# Patient Record
Sex: FEMALE | Race: WHITE | NOT HISPANIC OR LATINO | Employment: FULL TIME | URBAN - METROPOLITAN AREA
[De-identification: names, ages, dates, MRNs, and addresses within clinical notes are randomized per-mention and may not be internally consistent; named-entity substitution may affect disease eponyms.]

---

## 2022-04-04 ENCOUNTER — HOSPITAL ENCOUNTER (EMERGENCY)
Facility: HOSPITAL | Age: 42
Discharge: HOME | End: 2022-04-04
Attending: EMERGENCY MEDICINE

## 2022-04-04 VITALS
RESPIRATION RATE: 20 BRPM | BODY MASS INDEX: 23.04 KG/M2 | SYSTOLIC BLOOD PRESSURE: 124 MMHG | DIASTOLIC BLOOD PRESSURE: 82 MMHG | WEIGHT: 130 LBS | HEIGHT: 63 IN | TEMPERATURE: 98 F | HEART RATE: 116 BPM | OXYGEN SATURATION: 97 %
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
## 2022-04-04 DIAGNOSIS — L23.9 ALLERGIC DERMATITIS: ICD-10-CM

## 2022-04-04 PROCEDURE — 99284 EMERGENCY DEPT VISIT MOD MDM: CPT

## 2022-04-04 PROCEDURE — 63600175 PHARM REV CODE 636 W HCPCS: Performed by: NURSE PRACTITIONER

## 2022-04-04 PROCEDURE — 96372 THER/PROPH/DIAG INJ SC/IM: CPT | Performed by: NURSE PRACTITIONER

## 2022-04-04 RX ORDER — ARIPIPRAZOLE 400 MG
400 KIT INTRAMUSCULAR
COMMUNITY

## 2022-04-04 RX ORDER — TRIAMCINOLONE ACETONIDE 1 MG/G
CREAM TOPICAL 2 TIMES DAILY
Qty: 45 G | Refills: 0 | Status: SHIPPED | OUTPATIENT
Start: 2022-04-04 | End: 2023-09-17

## 2022-04-04 RX ORDER — TRIAMCINOLONE ACETONIDE 5 MG/G
OINTMENT TOPICAL 2 TIMES DAILY
Status: DISCONTINUED | OUTPATIENT
Start: 2022-04-04 | End: 2022-04-04

## 2022-04-04 RX ORDER — TRAZODONE HYDROCHLORIDE 100 MG/1
100 TABLET ORAL NIGHTLY
COMMUNITY
End: 2023-09-17

## 2022-04-04 RX ORDER — DEXAMETHASONE SODIUM PHOSPHATE 10 MG/ML
10 INJECTION INTRAMUSCULAR; INTRAVENOUS
Status: COMPLETED | OUTPATIENT
Start: 2022-04-04 | End: 2022-04-04

## 2022-04-04 RX ORDER — METHYLPREDNISOLONE 4 MG/1
TABLET ORAL
Qty: 1 EACH | Refills: 0 | Status: SHIPPED | OUTPATIENT
Start: 2022-04-04 | End: 2022-04-25

## 2022-04-04 RX ADMIN — DEXAMETHASONE SODIUM PHOSPHATE 10 MG: 10 INJECTION INTRAMUSCULAR; INTRAVENOUS at 02:04

## 2022-04-04 NOTE — DISCHARGE INSTRUCTIONS
Start medrol dose pack tomorrow if no improvement. Take the medications as prescribed. Return for any worsening or new symptoms. Follow up with Primary Care Provider in the next 2-3 days.

## 2022-04-04 NOTE — ED PROVIDER NOTES
"Encounter Date: 4/4/2022       History     Chief Complaint   Patient presents with    Insect Bite     PT reports multiple "bites" with itching unrelieved by benadryl and cortisone cream. Pt reports noticing bites after staying in a hotel in [place].     The history is provided by the patient.   Insect Bite  This is a new problem. The current episode started yesterday. The problem occurs constantly. The problem has been gradually worsening. Pertinent negatives include no chest pain, no abdominal pain, no headaches and no shortness of breath. Nothing aggravates the symptoms. Nothing relieves the symptoms. She has tried a warm compress for the symptoms. The treatment provided no relief.     Review of patient's allergies indicates:  No Known Allergies  No past medical history on file.  Past Surgical History:   Procedure Laterality Date    1 x csection      complete hysterectomy      cyst removed      foot surgery x 2 times       History reviewed. No pertinent family history.  Social History     Tobacco Use    Smoking status: Current Every Day Smoker     Types: Cigars    Smokeless tobacco: Never Used   Substance Use Topics    Alcohol use: Yes     Comment: occ    Drug use: Never     Review of Systems   Respiratory: Negative for shortness of breath.    Cardiovascular: Negative for chest pain.   Gastrointestinal: Negative for abdominal pain.   Skin: Positive for rash.   Neurological: Negative for headaches.   All other systems reviewed and are negative.      Physical Exam     Initial Vitals [04/04/22 1433]   BP Pulse Resp Temp SpO2   124/82 (!) 116 20 98.3 °F (36.8 °C) 97 %      MAP       --         Physical Exam    Nursing note and vitals reviewed.  Constitutional: She appears well-developed and well-nourished. No distress.   HENT:   Head: Normocephalic and atraumatic.   Eyes: EOM are normal. Pupils are equal, round, and reactive to light.   Neck:   Normal range of motion.  Cardiovascular: Normal rate and " regular rhythm.   No murmur heard.  Pulmonary/Chest: Breath sounds normal.   Abdominal: Abdomen is soft.   Musculoskeletal:         General: Normal range of motion.      Cervical back: Normal range of motion.     Neurological: She is alert and oriented to person, place, and time. She has normal strength. GCS score is 15. GCS eye subscore is 4. GCS verbal subscore is 5. GCS motor subscore is 6.   Skin: Skin is warm and dry. Rash noted. Rash is papular. There is erythema.        Psychiatric: She has a normal mood and affect.         ED Course   Procedures  Labs Reviewed - No data to display       Imaging Results    None          Medications   dexAMETHasone sodium phos (PF) injection 10 mg (10 mg Intramuscular Given 4/4/22 1450)     Medical Decision Making:   Differential Diagnosis:   Cellulitis, erysipelas, skin abscess, insect bite, necrotizing fasciitis, contact dermatitis     ED Management:  Patient presents with appears to be a superficial skin reaction from insect bites.    Patient does not have evidence for a more malignant process at this time.      The patient does not have evidence for abscess or underlying foreign body.  Patient does not have signs of systemic illness/sepsis.      Please return for new, changing, or worsening pain. The patient was also instructed that follow up with a primary care physician is strongly recommended after any visit to the ED.  Patient expressed understanding and agreed with treatment plan and was discharged in stable condition.                         Clinical Impression:   Final diagnoses:  [W57.XXXA] Insect bite, unspecified site, initial encounter (Primary)  [L23.9] Allergic dermatitis          ED Disposition Condition    Discharge Stable        ED Prescriptions     Medication Sig Dispense Start Date End Date Auth. Provider    methylPREDNISolone (MEDROL DOSEPACK) 4 mg tablet Take it with meals as directed 1 each 4/4/2022 4/25/2022 Tequila Barnhart NP    triamcinolone acetonide  0.1% (KENALOG) 0.1 % cream Apply topically 2 (two) times daily. Do use it on the face 45 g 4/4/2022  Tequila Barnhart NP        Follow-up Information     Follow up With Specialties Details Why Contact Info    PCP  In 2 days      [facility] Emergency Dept Emergency Medicine  If symptoms worsen [address] [facility] [id number] [phone]           Tequila Barnhart NP  04/05/22 0043

## 2023-09-12 ENCOUNTER — HOSPITAL ENCOUNTER (EMERGENCY)
Facility: HOSPITAL | Age: 43
Discharge: HOME | End: 2023-09-12
Attending: EMERGENCY MEDICINE
Payer: COMMERCIAL

## 2023-09-12 VITALS
HEIGHT: 63 IN | WEIGHT: 150 LBS | SYSTOLIC BLOOD PRESSURE: 119 MMHG | BODY MASS INDEX: 26.58 KG/M2 | HEART RATE: 97 BPM | DIASTOLIC BLOOD PRESSURE: 78 MMHG | OXYGEN SATURATION: 100 % | RESPIRATION RATE: 18 BRPM | TEMPERATURE: 98 F

## 2023-09-12 DIAGNOSIS — N20.1 URETEROLITHIASIS: Primary | ICD-10-CM

## 2023-09-12 DIAGNOSIS — N23 RENAL COLIC ON RIGHT SIDE: ICD-10-CM

## 2023-09-12 DIAGNOSIS — N39.0 URINARY TRACT INFECTION WITH HEMATURIA, SITE UNSPECIFIED: ICD-10-CM

## 2023-09-12 DIAGNOSIS — R10.9 ABDOMINAL PAIN: ICD-10-CM

## 2023-09-12 DIAGNOSIS — R31.9 URINARY TRACT INFECTION WITH HEMATURIA, SITE UNSPECIFIED: ICD-10-CM

## 2023-09-12 LAB
ALBUMIN SERPL BCP-MCNC: 3.7 G/DL (ref 3.5–5.2)
ALP SERPL-CCNC: 85 U/L (ref 55–135)
ALT SERPL W/O P-5'-P-CCNC: 18 U/L (ref 10–44)
ANION GAP SERPL CALC-SCNC: 10 MMOL/L (ref 8–16)
AST SERPL-CCNC: 14 U/L (ref 10–40)
BACTERIA #/AREA URNS HPF: ABNORMAL /HPF
BASOPHILS # BLD AUTO: 0.05 K/UL (ref 0–0.2)
BASOPHILS NFR BLD: 0.4 % (ref 0–1.9)
BILIRUB SERPL-MCNC: 0.3 MG/DL (ref 0.1–1)
BILIRUB UR QL STRIP: NEGATIVE
BUN SERPL-MCNC: 11 MG/DL (ref 6–20)
CALCIUM SERPL-MCNC: 8.8 MG/DL (ref 8.7–10.5)
CHLORIDE SERPL-SCNC: 103 MMOL/L (ref 95–110)
CLARITY UR: ABNORMAL
CO2 SERPL-SCNC: 27 MMOL/L (ref 23–29)
COLOR UR: YELLOW
CREAT SERPL-MCNC: 0.7 MG/DL (ref 0.5–1.4)
DIFFERENTIAL METHOD: ABNORMAL
EOSINOPHIL # BLD AUTO: 0.4 K/UL (ref 0–0.5)
EOSINOPHIL NFR BLD: 3.5 % (ref 0–8)
ERYTHROCYTE [DISTWIDTH] IN BLOOD BY AUTOMATED COUNT: 13.6 % (ref 11.5–14.5)
EST. GFR  (NO RACE VARIABLE): >60 ML/MIN/1.73 M^2
GLUCOSE SERPL-MCNC: 80 MG/DL (ref 70–110)
GLUCOSE UR QL STRIP: NEGATIVE
HCT VFR BLD AUTO: 34.2 % (ref 37–48.5)
HGB BLD-MCNC: 11.4 G/DL (ref 12–16)
HGB UR QL STRIP: ABNORMAL
IMM GRANULOCYTES # BLD AUTO: 0.03 K/UL (ref 0–0.04)
IMM GRANULOCYTES NFR BLD AUTO: 0.2 % (ref 0–0.5)
KETONES UR QL STRIP: NEGATIVE
LEUKOCYTE ESTERASE UR QL STRIP: ABNORMAL
LYMPHOCYTES # BLD AUTO: 3.1 K/UL (ref 1–4.8)
LYMPHOCYTES NFR BLD: 25.5 % (ref 18–48)
MCH RBC QN AUTO: 30.1 PG (ref 27–31)
MCHC RBC AUTO-ENTMCNC: 33.3 G/DL (ref 32–36)
MCV RBC AUTO: 90 FL (ref 82–98)
MICROSCOPIC COMMENT: ABNORMAL
MONOCYTES # BLD AUTO: 0.9 K/UL (ref 0.3–1)
MONOCYTES NFR BLD: 7.7 % (ref 4–15)
NEUTROPHILS # BLD AUTO: 7.5 K/UL (ref 1.8–7.7)
NEUTROPHILS NFR BLD: 62.7 % (ref 38–73)
NITRITE UR QL STRIP: NEGATIVE
NRBC BLD-RTO: 0 /100 WBC
PH UR STRIP: 6 [PH] (ref 5–8)
PLATELET # BLD AUTO: 240 K/UL (ref 150–450)
PMV BLD AUTO: 9.5 FL (ref 9.2–12.9)
POTASSIUM SERPL-SCNC: 3.8 MMOL/L (ref 3.5–5.1)
PROT SERPL-MCNC: 7.5 G/DL (ref 6–8.4)
PROT UR QL STRIP: ABNORMAL
RBC # BLD AUTO: 3.79 M/UL (ref 4–5.4)
RBC #/AREA URNS HPF: 9 /HPF (ref 0–4)
SODIUM SERPL-SCNC: 140 MMOL/L (ref 136–145)
SP GR UR STRIP: 1.02 (ref 1–1.03)
SQUAMOUS #/AREA URNS HPF: 4 /HPF
URN SPEC COLLECT METH UR: ABNORMAL
UROBILINOGEN UR STRIP-ACNC: NEGATIVE EU/DL
WBC # BLD AUTO: 12.02 K/UL (ref 3.9–12.7)
WBC #/AREA URNS HPF: 55 /HPF (ref 0–5)

## 2023-09-12 PROCEDURE — 81000 URINALYSIS NONAUTO W/SCOPE: CPT | Performed by: EMERGENCY MEDICINE

## 2023-09-12 PROCEDURE — 99285 EMERGENCY DEPT VISIT HI MDM: CPT | Mod: 25

## 2023-09-12 PROCEDURE — 74176 CT ABD & PELVIS W/O CONTRAST: CPT | Mod: TC

## 2023-09-12 PROCEDURE — 25000003 PHARM REV CODE 250: Performed by: EMERGENCY MEDICINE

## 2023-09-12 PROCEDURE — 74176 CT ABD & PELVIS W/O CONTRAST: CPT | Mod: 26,,, | Performed by: RADIOLOGY

## 2023-09-12 PROCEDURE — 87088 URINE BACTERIA CULTURE: CPT | Performed by: EMERGENCY MEDICINE

## 2023-09-12 PROCEDURE — 63600175 PHARM REV CODE 636 W HCPCS: Performed by: EMERGENCY MEDICINE

## 2023-09-12 PROCEDURE — 36415 COLL VENOUS BLD VENIPUNCTURE: CPT | Performed by: EMERGENCY MEDICINE

## 2023-09-12 PROCEDURE — 96372 THER/PROPH/DIAG INJ SC/IM: CPT | Performed by: EMERGENCY MEDICINE

## 2023-09-12 PROCEDURE — 87086 URINE CULTURE/COLONY COUNT: CPT | Performed by: EMERGENCY MEDICINE

## 2023-09-12 PROCEDURE — 74176 CT ABDOMEN PELVIS WITHOUT CONTRAST: ICD-10-PCS | Mod: 26,,, | Performed by: RADIOLOGY

## 2023-09-12 PROCEDURE — 80053 COMPREHEN METABOLIC PANEL: CPT | Performed by: EMERGENCY MEDICINE

## 2023-09-12 PROCEDURE — 96360 HYDRATION IV INFUSION INIT: CPT

## 2023-09-12 PROCEDURE — 85025 COMPLETE CBC W/AUTO DIFF WBC: CPT | Performed by: EMERGENCY MEDICINE

## 2023-09-12 RX ORDER — LIDOCAINE HYDROCHLORIDE 10 MG/ML
1 INJECTION INFILTRATION; PERINEURAL
Status: COMPLETED | OUTPATIENT
Start: 2023-09-12 | End: 2023-09-12

## 2023-09-12 RX ORDER — CEFTRIAXONE 1 G/1
1 INJECTION, POWDER, FOR SOLUTION INTRAMUSCULAR; INTRAVENOUS
Status: COMPLETED | OUTPATIENT
Start: 2023-09-12 | End: 2023-09-12

## 2023-09-12 RX ORDER — CELECOXIB 100 MG/1
100 CAPSULE ORAL
COMMUNITY
Start: 2023-03-03 | End: 2023-09-17

## 2023-09-12 RX ORDER — CIPROFLOXACIN 500 MG/1
500 TABLET ORAL 2 TIMES DAILY
Qty: 20 TABLET | Refills: 0 | Status: SHIPPED | OUTPATIENT
Start: 2023-09-12 | End: 2023-09-17 | Stop reason: HOSPADM

## 2023-09-12 RX ORDER — ALBUTEROL SULFATE 90 UG/1
2 AEROSOL, METERED RESPIRATORY (INHALATION) EVERY 6 HOURS PRN
COMMUNITY
Start: 2023-07-19

## 2023-09-12 RX ORDER — TAMSULOSIN HYDROCHLORIDE 0.4 MG/1
0.4 CAPSULE ORAL DAILY
Qty: 10 CAPSULE | Refills: 0 | Status: SHIPPED | OUTPATIENT
Start: 2023-09-12 | End: 2023-09-17 | Stop reason: HOSPADM

## 2023-09-12 RX ORDER — ESTRADIOL 0.5 MG/1
0.5 TABLET ORAL DAILY
COMMUNITY
Start: 2023-05-30

## 2023-09-12 RX ORDER — BUSPIRONE HYDROCHLORIDE 10 MG/1
10 TABLET ORAL 2 TIMES DAILY
COMMUNITY
Start: 2023-06-01 | End: 2023-09-17

## 2023-09-12 RX ORDER — HYDROCODONE BITARTRATE AND ACETAMINOPHEN 10; 325 MG/1; MG/1
1 TABLET ORAL EVERY 6 HOURS PRN
Qty: 20 TABLET | Refills: 0 | Status: SHIPPED | OUTPATIENT
Start: 2023-09-12 | End: 2023-09-17 | Stop reason: HOSPADM

## 2023-09-12 RX ORDER — KETOROLAC TROMETHAMINE 30 MG/ML
60 INJECTION, SOLUTION INTRAMUSCULAR; INTRAVENOUS
Status: COMPLETED | OUTPATIENT
Start: 2023-09-12 | End: 2023-09-12

## 2023-09-12 RX ORDER — PROPRANOLOL HYDROCHLORIDE 20 MG/1
20 TABLET ORAL 2 TIMES DAILY
COMMUNITY
Start: 2023-05-30

## 2023-09-12 RX ORDER — PANTOPRAZOLE SODIUM 40 MG/1
40 TABLET, DELAYED RELEASE ORAL
COMMUNITY
Start: 2023-03-03 | End: 2023-09-17

## 2023-09-12 RX ORDER — FLUTICASONE PROPIONATE 50 MCG
1 SPRAY, SUSPENSION (ML) NASAL 2 TIMES DAILY
COMMUNITY
Start: 2023-07-19

## 2023-09-12 RX ADMIN — SODIUM CHLORIDE 1000 ML: 9 INJECTION, SOLUTION INTRAVENOUS at 10:09

## 2023-09-12 RX ADMIN — LIDOCAINE HYDROCHLORIDE 1 ML: 10 INJECTION, SOLUTION INFILTRATION; PERINEURAL at 09:09

## 2023-09-12 RX ADMIN — CEFTRIAXONE SODIUM 1 G: 1 INJECTION, POWDER, FOR SOLUTION INTRAMUSCULAR; INTRAVENOUS at 09:09

## 2023-09-12 RX ADMIN — KETOROLAC TROMETHAMINE 60 MG: 30 INJECTION, SOLUTION INTRAMUSCULAR; INTRAVENOUS at 08:09

## 2023-09-13 NOTE — ED NOTES
Pt here for complaints of right sided flank and RLQ abdominal pain x5 days.  Pt states the pain comes and goes in waves and increases in intensity when it does start to hurt.  Pt states she thought it might be a uti due to how many times she has been voiding but denies any dysuria.  Pt also states she is having some discomfort to the right lower back.  Pt voicing no other concerns at this time.  No questions asked.  Updated on current plan of care and agreeable to plan.  NAD noted.

## 2023-09-13 NOTE — ED PROVIDER NOTES
Encounter Date: 9/12/2023       History     Chief Complaint   Patient presents with    Abdominal Pain     Intermittent RLQ abdominal pain radiating to R flank x 5 days.     Pt here with RLQ pain and right flank pain the past 5days. No dysuria or hematuria. She reports diarrhea this am but says that is not uncommon for her bc she has IBS. No fever. No recent abx. Pain sharp and intermittent. It was worse 2days ago.     The history is provided by the patient.   Abdominal Pain  The current episode started several days ago. The onset of the illness was gradual. The problem has been gradually improving. The abdominal pain is located in the RLQ and right flank. The abdominal pain does not radiate. The severity of the abdominal pain is 6/10. The abdominal pain is relieved by nothing. The other symptoms of the illness include diarrhea. The other symptoms of the illness do not include fever, fatigue, jaundice, melena, nausea, vomiting, dysuria, hematemesis, hematochezia, vaginal discharge or vaginal bleeding.   The diarrhea occurs 2 - 4 times per day.   The patient has not had a change in bowel habit. Symptoms associated with the illness do not include chills, anorexia, diaphoresis, heartburn, constipation, urgency, hematuria, frequency or back pain.     Review of patient's allergies indicates:  No Known Allergies  Past Medical History:   Diagnosis Date    Bipolar disorder, unspecified     GERD (gastroesophageal reflux disease)      Past Surgical History:   Procedure Laterality Date    1 x csection      complete hysterectomy      cyst removed      foot surgery x 2 times       History reviewed. No pertinent family history.  Social History     Tobacco Use    Smoking status: Every Day     Types: Cigars    Smokeless tobacco: Never   Substance Use Topics    Alcohol use: Yes     Comment: occ    Drug use: Never     Review of Systems   Constitutional:  Negative for chills, diaphoresis, fatigue and fever.   Gastrointestinal:   Positive for abdominal pain and diarrhea. Negative for anorexia, constipation, heartburn, hematemesis, hematochezia, jaundice, melena, nausea and vomiting.   Genitourinary:  Negative for dysuria, frequency, hematuria, urgency, vaginal bleeding and vaginal discharge.   Musculoskeletal:  Negative for back pain.   All other systems reviewed and are negative.      Physical Exam     Initial Vitals   BP Pulse Resp Temp SpO2   09/12/23 2027 09/12/23 2024 09/12/23 2024 09/12/23 2024 09/12/23 2024   121/79 100 20 98.2 °F (36.8 °C) 99 %      MAP       --                Physical Exam    Nursing note and vitals reviewed.  Constitutional: She appears well-developed and well-nourished. She is not diaphoretic. No distress.   HENT:   Mouth/Throat: Oropharynx is clear and moist.   Eyes: No scleral icterus.   Cardiovascular:  Normal rate, regular rhythm, normal heart sounds and intact distal pulses.           Pulmonary/Chest: Breath sounds normal. She exhibits no tenderness.   Abdominal: Abdomen is soft. There is no abdominal tenderness.   No CVAT   Musculoskeletal:         General: No tenderness or edema. Normal range of motion.     Neurological: She is alert and oriented to person, place, and time. GCS score is 15. GCS eye subscore is 4. GCS verbal subscore is 5. GCS motor subscore is 6.   Skin: Skin is warm and dry. Capillary refill takes less than 2 seconds. No rash noted. No erythema. No pallor.   Psychiatric: She has a normal mood and affect.         ED Course   Procedures  Labs Reviewed   URINALYSIS, REFLEX TO URINE CULTURE - Abnormal; Notable for the following components:       Result Value    Appearance, UA Hazy (*)     Protein, UA Trace (*)     Occult Blood UA 1+ (*)     Leukocytes, UA 1+ (*)     All other components within normal limits    Narrative:     Preferred Collection Type->Urine, Clean Catch  Specimen Source->Urine   URINALYSIS MICROSCOPIC - Abnormal; Notable for the following components:    RBC, UA 9 (*)     WBC,  UA 55 (*)     Bacteria Few (*)     All other components within normal limits    Narrative:     Preferred Collection Type->Urine, Clean Catch  Specimen Source->Urine   CBC W/ AUTO DIFFERENTIAL - Abnormal; Notable for the following components:    RBC 3.79 (*)     Hemoglobin 11.4 (*)     Hematocrit 34.2 (*)     All other components within normal limits   CULTURE, URINE   COMPREHENSIVE METABOLIC PANEL          Imaging Results              CT Abdomen Pelvis  Without Contrast (Final result)  Result time 09/12/23 21:49:40      Final result by Terese Adams MD (09/12/23 21:49:40)                   Impression:      5 mm obstructive stone in the right distal ureter with resultant severe hydroureteronephrosis. Diffuse infectious or inflammatory right perinephric fat stranding.      Electronically signed by: Terese Adams  Date:    09/12/2023  Time:    21:49               Narrative:    EXAMINATION:  CT ABDOMEN PELVIS WITHOUT CONTRAST    CLINICAL HISTORY:  Flank pain, kidney stone suspected;RLQ abdominal pain (Age >= 14y);    TECHNIQUE:  Low dose axial images, sagittal and coronal reformations were obtained from the lung bases to the pubic symphysis.  Oral contrast was not administered.    COMPARISON:  None    FINDINGS:  5 mm obstructive stone in the right distal ureter with resultant severe hydroureteronephrosis.  Diffuse infectious or inflammatory right perinephric fat stranding.    Unremarkable left kidney and ureter.  Unremarkable adrenal glands.    Lung bases are clear.    Unremarkable liver and gallbladder.    Unremarkable spleen and pancreas.    No small bowel obstruction.    No acute infectious or inflammatory process involving bowel.  Normal appendix.    No free air, free fluid, or lymphadenopathy.    Unremarkable bladder and uterus.    Unremarkable aorta and its branches.  Small fat containing umbilical hernia..    Bones are intact.                                       Medications   sodium chloride 0.9%  bolus 1,000 mL 1,000 mL (1,000 mLs Intravenous New Bag 9/12/23 2230)   ketorolac injection 60 mg (60 mg Intramuscular Given 9/12/23 2042)   cefTRIAXone injection 1 g (1 g Intramuscular Given 9/12/23 2114)   LIDOcaine HCL 10 mg/ml (1%) injection 1 mL (1 mL Intramuscular Given 9/12/23 2114)     Medical Decision Making  Pt presented with right flank pain for 5days. Benign exam. UA with minor infection. Rocephin given. Toradol given for pain. CT showed 5mm obstructing stone in right distal ureter. Labs checked and unremarkable. Normal WBC and renal function. Will Rx norco and cipro and send urology referral. Return precautions discussed.     Amount and/or Complexity of Data Reviewed  Labs: ordered. Decision-making details documented in ED Course.  Radiology: ordered. Decision-making details documented in ED Course.    Risk  Prescription drug management.               ED Course as of 09/12/23 2247   Tue Sep 12, 2023   2150 CT reviewed. Distal ureteral stone with mod hydro and stranding seen. Rads read pending.  [DC]   2154 CT abd:  Impression:     5 mm obstructive stone in the right distal ureter with resultant severe hydroureteronephrosis. Diffuse infectious or inflammatory right perinephric fat stranding. [DC]   2154 Will place IV and given IVFs and check Cr. [DC]      ED Course User Index  [DC] Iban Poe Jr., MD                    Clinical Impression:   Final diagnoses:  [R10.9] Abdominal pain  [N20.1] Ureterolithiasis (Primary)  [N23] Renal colic on right side  [N39.0, R31.9] Urinary tract infection with hematuria, site unspecified        ED Disposition Condition    Discharge Stable          ED Prescriptions       Medication Sig Dispense Start Date End Date Auth. Provider    ciprofloxacin HCl (CIPRO) 500 MG tablet Take 1 tablet (500 mg total) by mouth 2 (two) times daily. for 10 days 20 tablet 9/12/2023 9/22/2023 Iban Poe Jr., MD    HYDROcodone-acetaminophen (NORCO)  mg per tablet Take 1 tablet by  mouth every 6 (six) hours as needed for Pain. 20 tablet 9/12/2023 -- Iban Poe Jr., MD    tamsulosin (FLOMAX) 0.4 mg Cap Take 1 capsule (0.4 mg total) by mouth once daily. 10 capsule 9/12/2023 -- Iban Poe Jr., MD          Follow-up Information       Follow up With Specialties Details Why Contact Info    Ivan Meek MD Family Medicine Schedule an appointment as soon as possible for a visit   [address] [place] [id number] [phone]               Iban Poe Jr., MD  09/12/23 0618

## 2023-09-14 LAB — BACTERIA UR CULT: ABNORMAL

## 2023-09-17 ENCOUNTER — HOSPITAL ENCOUNTER (OUTPATIENT)
Facility: HOSPITAL | Age: 43
LOS: 1 days | Discharge: HOME | End: 2023-09-18
Attending: STUDENT IN AN ORGANIZED HEALTH CARE EDUCATION/TRAINING PROGRAM | Admitting: HOSPITALIST
Payer: COMMERCIAL

## 2023-09-17 DIAGNOSIS — N39.0 URINARY TRACT INFECTION WITHOUT HEMATURIA, SITE UNSPECIFIED: ICD-10-CM

## 2023-09-17 DIAGNOSIS — R52 INTRACTABLE PAIN: ICD-10-CM

## 2023-09-17 DIAGNOSIS — N13.9 ACUTE UNILATERAL OBSTRUCTIVE UROPATHY: ICD-10-CM

## 2023-09-17 DIAGNOSIS — N20.0 KIDNEY STONE: ICD-10-CM

## 2023-09-17 DIAGNOSIS — N20.1 RIGHT URETERAL STONE: Primary | ICD-10-CM

## 2023-09-17 LAB
ALBUMIN SERPL BCP-MCNC: 3.9 G/DL (ref 3.5–5.2)
ALLENS TEST: ABNORMAL
ALP SERPL-CCNC: 86 U/L (ref 55–135)
ALT SERPL W/O P-5'-P-CCNC: 25 U/L (ref 10–44)
ANION GAP SERPL CALC-SCNC: 14 MMOL/L (ref 8–16)
APTT PPP: 26.1 SEC (ref 21–32)
AST SERPL-CCNC: 18 U/L (ref 10–40)
BACTERIA #/AREA URNS HPF: ABNORMAL /HPF
BASOPHILS # BLD AUTO: 0.03 K/UL (ref 0–0.2)
BASOPHILS NFR BLD: 0.4 % (ref 0–1.9)
BILIRUB SERPL-MCNC: 0.2 MG/DL (ref 0.1–1)
BILIRUB UR QL STRIP: NEGATIVE
BUN SERPL-MCNC: 12 MG/DL (ref 6–20)
CALCIUM SERPL-MCNC: 9.9 MG/DL (ref 8.7–10.5)
CHLORIDE SERPL-SCNC: 103 MMOL/L (ref 95–110)
CLARITY UR: ABNORMAL
CO2 SERPL-SCNC: 25 MMOL/L (ref 23–29)
COLOR UR: YELLOW
CREAT SERPL-MCNC: 0.7 MG/DL (ref 0.5–1.4)
DELSYS: ABNORMAL
DIFFERENTIAL METHOD: ABNORMAL
EOSINOPHIL # BLD AUTO: 0.2 K/UL (ref 0–0.5)
EOSINOPHIL NFR BLD: 2.8 % (ref 0–8)
ERYTHROCYTE [DISTWIDTH] IN BLOOD BY AUTOMATED COUNT: 13.2 % (ref 11.5–14.5)
EST. GFR  (NO RACE VARIABLE): >60 ML/MIN/1.73 M^2
GLUCOSE SERPL-MCNC: 92 MG/DL (ref 70–110)
GLUCOSE UR QL STRIP: NEGATIVE
HCT VFR BLD AUTO: 36.3 % (ref 37–48.5)
HGB BLD-MCNC: 11.7 G/DL (ref 12–16)
HGB UR QL STRIP: NEGATIVE
IMM GRANULOCYTES # BLD AUTO: 0.02 K/UL (ref 0–0.04)
IMM GRANULOCYTES NFR BLD AUTO: 0.2 % (ref 0–0.5)
INR PPP: 0.9 (ref 0.8–1.2)
KETONES UR QL STRIP: NEGATIVE
LACTATE SERPL-SCNC: 0.9 MMOL/L (ref 0.5–2.2)
LDH SERPL L TO P-CCNC: 1.63 MMOL/L (ref 0.36–1.25)
LEUKOCYTE ESTERASE UR QL STRIP: ABNORMAL
LYMPHOCYTES # BLD AUTO: 2.3 K/UL (ref 1–4.8)
LYMPHOCYTES NFR BLD: 27.3 % (ref 18–48)
MCH RBC QN AUTO: 29 PG (ref 27–31)
MCHC RBC AUTO-ENTMCNC: 32.2 G/DL (ref 32–36)
MCV RBC AUTO: 90 FL (ref 82–98)
MICROSCOPIC COMMENT: ABNORMAL
MONOCYTES # BLD AUTO: 0.6 K/UL (ref 0.3–1)
MONOCYTES NFR BLD: 6.8 % (ref 4–15)
NEUTROPHILS # BLD AUTO: 5.2 K/UL (ref 1.8–7.7)
NEUTROPHILS NFR BLD: 62.5 % (ref 38–73)
NITRITE UR QL STRIP: NEGATIVE
NRBC BLD-RTO: 0 /100 WBC
PH UR STRIP: 7 [PH] (ref 5–8)
PLATELET # BLD AUTO: 325 K/UL (ref 150–450)
PMV BLD AUTO: 9.2 FL (ref 9.2–12.9)
POTASSIUM SERPL-SCNC: 3.4 MMOL/L (ref 3.5–5.1)
PROT SERPL-MCNC: 8.1 G/DL (ref 6–8.4)
PROT UR QL STRIP: NEGATIVE
PROTHROMBIN TIME: 10.3 SEC (ref 9–12.5)
RBC # BLD AUTO: 4.03 M/UL (ref 4–5.4)
RBC #/AREA URNS HPF: 2 /HPF (ref 0–4)
SAMPLE: ABNORMAL
SITE: ABNORMAL
SODIUM SERPL-SCNC: 142 MMOL/L (ref 136–145)
SP GR UR STRIP: 1.01 (ref 1–1.03)
SQUAMOUS #/AREA URNS HPF: 13 /HPF
URN SPEC COLLECT METH UR: ABNORMAL
UROBILINOGEN UR STRIP-ACNC: NEGATIVE EU/DL
WBC # BLD AUTO: 8.36 K/UL (ref 3.9–12.7)
WBC #/AREA URNS HPF: 9 /HPF (ref 0–5)
YEAST URNS QL MICRO: ABNORMAL

## 2023-09-17 PROCEDURE — 99900035 HC TECH TIME PER 15 MIN (STAT)

## 2023-09-17 PROCEDURE — 36416 COLLJ CAPILLARY BLOOD SPEC: CPT

## 2023-09-17 PROCEDURE — 81000 URINALYSIS NONAUTO W/SCOPE: CPT | Performed by: STUDENT IN AN ORGANIZED HEALTH CARE EDUCATION/TRAINING PROGRAM

## 2023-09-17 PROCEDURE — 85610 PROTHROMBIN TIME: CPT | Performed by: STUDENT IN AN ORGANIZED HEALTH CARE EDUCATION/TRAINING PROGRAM

## 2023-09-17 PROCEDURE — 25000003 PHARM REV CODE 250: Performed by: NURSE PRACTITIONER

## 2023-09-17 PROCEDURE — 99214 OFFICE O/P EST MOD 30 MIN: CPT | Mod: ,,, | Performed by: UROLOGY

## 2023-09-17 PROCEDURE — G0378 HOSPITAL OBSERVATION PER HR: HCPCS

## 2023-09-17 PROCEDURE — 63600175 PHARM REV CODE 636 W HCPCS: Performed by: NURSE PRACTITIONER

## 2023-09-17 PROCEDURE — 87040 BLOOD CULTURE FOR BACTERIA: CPT | Mod: 59 | Performed by: STUDENT IN AN ORGANIZED HEALTH CARE EDUCATION/TRAINING PROGRAM

## 2023-09-17 PROCEDURE — 36415 COLL VENOUS BLD VENIPUNCTURE: CPT | Performed by: STUDENT IN AN ORGANIZED HEALTH CARE EDUCATION/TRAINING PROGRAM

## 2023-09-17 PROCEDURE — 63600175 PHARM REV CODE 636 W HCPCS: Performed by: STUDENT IN AN ORGANIZED HEALTH CARE EDUCATION/TRAINING PROGRAM

## 2023-09-17 PROCEDURE — 85730 THROMBOPLASTIN TIME PARTIAL: CPT | Performed by: STUDENT IN AN ORGANIZED HEALTH CARE EDUCATION/TRAINING PROGRAM

## 2023-09-17 PROCEDURE — 96365 THER/PROPH/DIAG IV INF INIT: CPT

## 2023-09-17 PROCEDURE — 96375 TX/PRO/DX INJ NEW DRUG ADDON: CPT

## 2023-09-17 PROCEDURE — 94761 N-INVAS EAR/PLS OXIMETRY MLT: CPT

## 2023-09-17 PROCEDURE — 25000003 PHARM REV CODE 250: Performed by: STUDENT IN AN ORGANIZED HEALTH CARE EDUCATION/TRAINING PROGRAM

## 2023-09-17 PROCEDURE — 80053 COMPREHEN METABOLIC PANEL: CPT | Performed by: STUDENT IN AN ORGANIZED HEALTH CARE EDUCATION/TRAINING PROGRAM

## 2023-09-17 PROCEDURE — 93010 ELECTROCARDIOGRAM REPORT: CPT | Mod: ,,, | Performed by: GENERAL PRACTICE

## 2023-09-17 PROCEDURE — 83605 ASSAY OF LACTIC ACID: CPT | Performed by: STUDENT IN AN ORGANIZED HEALTH CARE EDUCATION/TRAINING PROGRAM

## 2023-09-17 PROCEDURE — 99214 PR OFFICE/OUTPT VISIT, EST, LEVL IV, 30-39 MIN: ICD-10-PCS | Mod: ,,, | Performed by: UROLOGY

## 2023-09-17 PROCEDURE — 96366 THER/PROPH/DIAG IV INF ADDON: CPT

## 2023-09-17 PROCEDURE — 93010 EKG 12-LEAD: ICD-10-PCS | Mod: ,,, | Performed by: GENERAL PRACTICE

## 2023-09-17 PROCEDURE — 83605 ASSAY OF LACTIC ACID: CPT

## 2023-09-17 PROCEDURE — 99285 EMERGENCY DEPT VISIT HI MDM: CPT | Mod: 25

## 2023-09-17 PROCEDURE — 85025 COMPLETE CBC W/AUTO DIFF WBC: CPT | Performed by: STUDENT IN AN ORGANIZED HEALTH CARE EDUCATION/TRAINING PROGRAM

## 2023-09-17 PROCEDURE — 93005 ELECTROCARDIOGRAM TRACING: CPT

## 2023-09-17 PROCEDURE — S4991 NICOTINE PATCH NONLEGEND: HCPCS | Performed by: NURSE PRACTITIONER

## 2023-09-17 RX ORDER — PROCHLORPERAZINE EDISYLATE 5 MG/ML
5 INJECTION INTRAMUSCULAR; INTRAVENOUS EVERY 6 HOURS PRN
Status: DISCONTINUED | OUTPATIENT
Start: 2023-09-17 | End: 2023-09-18 | Stop reason: HOSPADM

## 2023-09-17 RX ORDER — IBUPROFEN 200 MG
16 TABLET ORAL
Status: DISCONTINUED | OUTPATIENT
Start: 2023-09-17 | End: 2023-09-18 | Stop reason: HOSPADM

## 2023-09-17 RX ORDER — SODIUM,POTASSIUM PHOSPHATES 280-250MG
2 POWDER IN PACKET (EA) ORAL
Status: DISCONTINUED | OUTPATIENT
Start: 2023-09-17 | End: 2023-09-18 | Stop reason: HOSPADM

## 2023-09-17 RX ORDER — SODIUM CHLORIDE 0.9 % (FLUSH) 0.9 %
10 SYRINGE (ML) INJECTION EVERY 8 HOURS PRN
Status: DISCONTINUED | OUTPATIENT
Start: 2023-09-17 | End: 2023-09-18 | Stop reason: HOSPADM

## 2023-09-17 RX ORDER — ARIPIPRAZOLE 10 MG/1
10 TABLET ORAL DAILY
COMMUNITY
Start: 2023-07-19

## 2023-09-17 RX ORDER — HYDROMORPHONE HYDROCHLORIDE 1 MG/ML
1 INJECTION, SOLUTION INTRAMUSCULAR; INTRAVENOUS; SUBCUTANEOUS EVERY 4 HOURS PRN
Status: DISCONTINUED | OUTPATIENT
Start: 2023-09-17 | End: 2023-09-18 | Stop reason: HOSPADM

## 2023-09-17 RX ORDER — KETOROLAC TROMETHAMINE 30 MG/ML
15 INJECTION, SOLUTION INTRAMUSCULAR; INTRAVENOUS
Status: COMPLETED | OUTPATIENT
Start: 2023-09-17 | End: 2023-09-17

## 2023-09-17 RX ORDER — SIMETHICONE 80 MG
1 TABLET,CHEWABLE ORAL 4 TIMES DAILY PRN
Status: DISCONTINUED | OUTPATIENT
Start: 2023-09-17 | End: 2023-09-18 | Stop reason: HOSPADM

## 2023-09-17 RX ORDER — TALC
9 POWDER (GRAM) TOPICAL NIGHTLY PRN
Status: DISCONTINUED | OUTPATIENT
Start: 2023-09-17 | End: 2023-09-18 | Stop reason: HOSPADM

## 2023-09-17 RX ORDER — IBUPROFEN 200 MG
1 TABLET ORAL DAILY
Status: DISCONTINUED | OUTPATIENT
Start: 2023-09-17 | End: 2023-09-18 | Stop reason: HOSPADM

## 2023-09-17 RX ORDER — TRAZODONE HYDROCHLORIDE 100 MG/1
100 TABLET ORAL NIGHTLY
COMMUNITY

## 2023-09-17 RX ORDER — IPRATROPIUM BROMIDE AND ALBUTEROL SULFATE 2.5; .5 MG/3ML; MG/3ML
3 SOLUTION RESPIRATORY (INHALATION) EVERY 4 HOURS PRN
Status: DISCONTINUED | OUTPATIENT
Start: 2023-09-17 | End: 2023-09-18 | Stop reason: HOSPADM

## 2023-09-17 RX ORDER — ONDANSETRON 2 MG/ML
4 INJECTION INTRAMUSCULAR; INTRAVENOUS EVERY 8 HOURS PRN
Status: DISCONTINUED | OUTPATIENT
Start: 2023-09-17 | End: 2023-09-18 | Stop reason: HOSPADM

## 2023-09-17 RX ORDER — MAG HYDROX/ALUMINUM HYD/SIMETH 200-200-20
30 SUSPENSION, ORAL (FINAL DOSE FORM) ORAL 4 TIMES DAILY PRN
Status: DISCONTINUED | OUTPATIENT
Start: 2023-09-17 | End: 2023-09-18 | Stop reason: HOSPADM

## 2023-09-17 RX ORDER — ACETAMINOPHEN 325 MG/1
650 TABLET ORAL EVERY 6 HOURS PRN
Status: DISCONTINUED | OUTPATIENT
Start: 2023-09-17 | End: 2023-09-18 | Stop reason: HOSPADM

## 2023-09-17 RX ORDER — TAMSULOSIN HYDROCHLORIDE 0.4 MG/1
0.4 CAPSULE ORAL NIGHTLY
Qty: 30 CAPSULE | Refills: 0 | Status: SHIPPED | OUTPATIENT
Start: 2023-09-17 | End: 2023-10-17

## 2023-09-17 RX ORDER — LANOLIN ALCOHOL/MO/W.PET/CERES
800 CREAM (GRAM) TOPICAL
Status: DISCONTINUED | OUTPATIENT
Start: 2023-09-17 | End: 2023-09-18 | Stop reason: HOSPADM

## 2023-09-17 RX ORDER — SODIUM CHLORIDE 9 MG/ML
INJECTION, SOLUTION INTRAVENOUS ONCE
Status: COMPLETED | OUTPATIENT
Start: 2023-09-17 | End: 2023-09-18

## 2023-09-17 RX ORDER — TRAMADOL HYDROCHLORIDE 50 MG/1
50 TABLET ORAL EVERY 6 HOURS PRN
Qty: 10 TABLET | Refills: 0 | Status: SHIPPED | OUTPATIENT
Start: 2023-09-17

## 2023-09-17 RX ORDER — KETOROLAC TROMETHAMINE 10 MG/1
10 TABLET, FILM COATED ORAL EVERY 8 HOURS PRN
Qty: 10 TABLET | Refills: 0 | Status: SHIPPED | OUTPATIENT
Start: 2023-09-17

## 2023-09-17 RX ORDER — FENTANYL CITRATE 50 UG/ML
50 INJECTION, SOLUTION INTRAMUSCULAR; INTRAVENOUS
Status: COMPLETED | OUTPATIENT
Start: 2023-09-17 | End: 2023-09-17

## 2023-09-17 RX ORDER — TRAZODONE HYDROCHLORIDE 50 MG/1
100 TABLET ORAL NIGHTLY PRN
Status: DISCONTINUED | OUTPATIENT
Start: 2023-09-17 | End: 2023-09-18 | Stop reason: HOSPADM

## 2023-09-17 RX ORDER — ARIPIPRAZOLE 5 MG/1
10 TABLET ORAL DAILY
Status: DISCONTINUED | OUTPATIENT
Start: 2023-09-18 | End: 2023-09-18 | Stop reason: HOSPADM

## 2023-09-17 RX ORDER — MORPHINE SULFATE 4 MG/ML
4 INJECTION, SOLUTION INTRAMUSCULAR; INTRAVENOUS EVERY 4 HOURS PRN
Status: DISCONTINUED | OUTPATIENT
Start: 2023-09-17 | End: 2023-09-18 | Stop reason: HOSPADM

## 2023-09-17 RX ORDER — IBUPROFEN 200 MG
24 TABLET ORAL
Status: DISCONTINUED | OUTPATIENT
Start: 2023-09-17 | End: 2023-09-18 | Stop reason: HOSPADM

## 2023-09-17 RX ORDER — ACETAMINOPHEN 325 MG/1
650 TABLET ORAL EVERY 4 HOURS PRN
Status: DISCONTINUED | OUTPATIENT
Start: 2023-09-17 | End: 2023-09-18 | Stop reason: HOSPADM

## 2023-09-17 RX ORDER — NALOXONE HCL 0.4 MG/ML
0.02 VIAL (ML) INJECTION
Status: DISCONTINUED | OUTPATIENT
Start: 2023-09-17 | End: 2023-09-18 | Stop reason: HOSPADM

## 2023-09-17 RX ORDER — GLUCAGON 1 MG
1 KIT INJECTION
Status: DISCONTINUED | OUTPATIENT
Start: 2023-09-17 | End: 2023-09-18 | Stop reason: HOSPADM

## 2023-09-17 RX ORDER — TRAZODONE HYDROCHLORIDE 50 MG/1
50 TABLET ORAL NIGHTLY
COMMUNITY
Start: 2023-09-13 | End: 2023-09-17

## 2023-09-17 RX ADMIN — MORPHINE SULFATE 4 MG: 4 INJECTION, SOLUTION INTRAMUSCULAR; INTRAVENOUS at 09:09

## 2023-09-17 RX ADMIN — KETOROLAC TROMETHAMINE 15 MG: 30 INJECTION, SOLUTION INTRAMUSCULAR; INTRAVENOUS at 06:09

## 2023-09-17 RX ADMIN — CEFTRIAXONE SODIUM 1 G: 1 INJECTION, POWDER, FOR SOLUTION INTRAMUSCULAR; INTRAVENOUS at 05:09

## 2023-09-17 RX ADMIN — SODIUM CHLORIDE: 9 INJECTION, SOLUTION INTRAVENOUS at 09:09

## 2023-09-17 RX ADMIN — TRAZODONE HYDROCHLORIDE 100 MG: 50 TABLET ORAL at 11:09

## 2023-09-17 RX ADMIN — FENTANYL CITRATE 50 MCG: 0.05 INJECTION, SOLUTION INTRAMUSCULAR; INTRAVENOUS at 07:09

## 2023-09-17 RX ADMIN — NICOTINE 1 PATCH: 14 PATCH TRANSDERMAL at 11:09

## 2023-09-17 NOTE — ED PROVIDER NOTES
Encounter Date: 9/17/2023       History     Chief Complaint   Patient presents with    Flank Pain     Rt. Side  / seen at [facility] Tuesday      43-year-old female presents with right flank pain.  Currently on oral antibiotics Cipro.  Patient recently diagnosed with infected kidney stone since outpatient therapy with Urology follow up and strict return precautions by outside provider.  Patient reports she is not passed the stone at this point has uncontrolled pain.  Moderate to severe severity, associated nausea and vomiting and has difficulty taking home medications    The history is provided by the patient.     Review of patient's allergies indicates:  No Known Allergies  Past Medical History:   Diagnosis Date    Bipolar disorder, unspecified     GERD (gastroesophageal reflux disease)      Past Surgical History:   Procedure Laterality Date    1 x csection      complete hysterectomy      cyst removed      foot surgery x 2 times       No family history on file.  Social History     Tobacco Use    Smoking status: Every Day     Types: Cigars    Smokeless tobacco: Never   Substance Use Topics    Alcohol use: Yes     Comment: occ    Drug use: Never     Review of Systems   All other systems reviewed and are negative.      Physical Exam     Initial Vitals [09/17/23 1658]   BP Pulse Resp Temp SpO2   (!) 134/91 (!) 112 18 97.6 °F (36.4 °C) 98 %      MAP       --         Physical Exam    Nursing note and vitals reviewed.  Constitutional: She appears well-developed and well-nourished.   HENT:   Head: Normocephalic and atraumatic.   Eyes: EOM are normal. Right eye exhibits no discharge. Left eye exhibits no discharge.   Neck:   Normal range of motion.  Cardiovascular:  Regular rhythm.           Tachycardic   Pulmonary/Chest: Effort normal. No stridor. No respiratory distress.   No accessory muscle usage or significant hypoxemia   Abdominal: Abdomen is soft. There is no abdominal tenderness.   Genitourinary:    Genitourinary  Comments: Right CVA tenderness to palpation,  no left CVA tenderness     Musculoskeletal:         General: No edema. Normal range of motion.      Cervical back: Normal range of motion.     Neurological: She is alert.   No focal motor or sensory deficit noted   Skin: Skin is warm. No rash noted. No erythema.   Psychiatric:   Not anxious or agitated         ED Course   Procedures  Labs Reviewed   CBC W/ AUTO DIFFERENTIAL - Abnormal; Notable for the following components:       Result Value    Hemoglobin 11.7 (*)     Hematocrit 36.3 (*)     All other components within normal limits   COMPREHENSIVE METABOLIC PANEL - Abnormal; Notable for the following components:    Potassium 3.4 (*)     All other components within normal limits   URINALYSIS, REFLEX TO URINE CULTURE - Abnormal; Notable for the following components:    Appearance, UA Hazy (*)     Leukocytes, UA Trace (*)     All other components within normal limits    Narrative:     Specimen Source->Urine   URINALYSIS MICROSCOPIC - Abnormal; Notable for the following components:    WBC, UA 9 (*)     Yeast, UA Rare (*)     All other components within normal limits    Narrative:     Specimen Source->Urine   ISTAT LACTATE - Abnormal; Notable for the following components:    POC Lactate 1.63 (*)     All other components within normal limits   CULTURE, BLOOD   CULTURE, BLOOD   APTT   PROTIME-INR   LACTIC ACID, PLASMA     EKG Readings: (Independently Interpreted)   EKG interpreted by myself Ricardo Ga DO  Rate 85, normal sinus rhythm, QRS 78, , no STEMI       Imaging Results              CT Renal Stone Study ABD Pelvis WO (Final result)  Result time 09/17/23 18:34:18      Final result by Iban Munoz MD (09/17/23 18:34:18)                   Impression:      Persistent 5 x 6 mm calculus obstructing the distal RIGHT ureter.    Decreased perinephric stranding.      Electronically signed by: Iban Munoz  Date:    09/17/2023  Time:    18:34                Narrative:    EXAMINATION:  CT RENAL STONE STUDY ABD PELVIS WO    CLINICAL HISTORY:  Flank pain, kidney stone suspected;    TECHNIQUE:  Low dose axial images, sagittal and coronal reformations were obtained from the lung bases to the pubic symphysis.  Contrast was not administered.    COMPARISON:  09/12/2023    FINDINGS:  There is moderate hydro nephrosis and hydroureter on the RIGHT with a distal 5 x 6 mm 1100 Hounsfield units stone approximately 4 cm from the UVJ.  No other nonobstructing upper kidney stones are evident in either kidney.  Urinary bladder is unremarkable.    The liver, spleen, gallbladder, pancreas, adrenal glands and loops of large and small intestines appear unremarkable without contrast.  The uterus appears normal.    Bony structures are intact.  Aorta and vena cava normal in caliber.  No and abdominal wall or inguinal hernia.  The base of the heart pericardium, lung bases and bones appear intact.                                       X-Ray Chest AP Portable (Final result)  Result time 09/17/23 18:17:01      Final result by Genevieve Munoz MD (09/17/23 18:17:01)                   Impression:      No acute or focal abnormality.      Electronically signed by: Genevieve Munoz  Date:    09/17/2023  Time:    18:17               Narrative:    EXAMINATION:  XR CHEST AP PORTABLE    CLINICAL HISTORY:  Sepsis;    TECHNIQUE:  Single frontal view of the chest was performed.    COMPARISON:  None    FINDINGS:  The cardiomediastinal silhouette is not enlarged.  The lungs appear clear.  There is no pleural effusion or pneumothorax.  Imaged osseous structures are intact.                                       Medications   cefTRIAXone (ROCEPHIN) 1 g in dextrose 5 % in water (D5W) 100 mL IVPB (MB+) (0 g Intravenous Stopped 9/17/23 1919)   ketorolac injection 15 mg (15 mg Intravenous Given 9/17/23 1830)   fentaNYL 50 mcg/mL injection 50 mcg (50 mcg Intravenous Given 9/17/23 1910)     Medical Decision  Making  43-year-old female presents with right-sided flank pain.  History of obstructive stone with UTI.  Unable to tolerate oral antibiotics due to nausea and vomiting.  Outside records reviewed demonstrated right-sided obstructive kidney stone.  Sepsis protocol initiated, patient administered IV Rocephin.  CT imaging we obtained to rule out any acute kidney injury such as rupture of calyx.  CT imaging with persistent obstructing stone with no definitive kidney structural injury.  Communicated with Dr. Isaac urologist via secure chat we will perform procedure today to remove stone, urinalysis evidence of infection.  We will admit to hospitalist for intractable pain and treatment of UTI.  Spoke with Sandeep Mensah nurse practitioner with hospitalist team.  Patient updated and agrees with plan.  Patient administered IV fentanyl and IV Toradol for pain control    Amount and/or Complexity of Data Reviewed  External Data Reviewed: labs and radiology.     Details: UTI, obstructed right-sided kidney stone with severe hydronephrosis  Labs: ordered. Decision-making details documented in ED Course.  Radiology: ordered.  ECG/medicine tests: ordered and independent interpretation performed.     Details: No STEMI    Risk  Prescription drug management.  Decision regarding hospitalization.               ED Course as of 09/17/23 1926   Sun Sep 17, 2023   1848 Sodium: 142 [KB]   1848 Potassium(!): 3.4 [KB]   1848 Glucose: 92 [KB]   1848 BUN: 12 [KB]   1848 Creatinine: 0.7 [KB]   1848 INR: 0.9 [KB]   1848 aPTT: 26.1 [KB]   1848 WBC: 8.36 [KB]   1848 Hemoglobin(!): 11.7 [KB]   1848 Hematocrit(!): 36.3 [KB]   1848 Sample: CAPILLARY [KB]   1848 POC Lactate(!): 1.63 [KB]   1848 WBC, UA(!): 9 [KB]   1848 Specimen UA: Urine, Clean Catch [KB]   1848 Appearance, UA(!): Hazy [KB]   1920 This patient does have evidence of infective focus  My overall impression is UTI, Obstructed kidney stone.  Source: Urinary Tract  Antibiotics given-  Antibiotics (72h ago, onward)    None      Latest lactate reviewed-  @[id number](lactate,poclac)@  Organ dysfunction indicated by N/A    Fluid challenge Not needed - patient is not hypotensive      Post- resuscitation assessment No - Post resuscitation assessment not needed       Will Not start Pressors- Levophed for MAP of 65  Source control achieved by: Ceftriaxone   [KB]      ED Course User Index  [KB] Ricardo Ga Jr., DO                      Clinical Impression:   Final diagnoses:  [R52] Intractable pain (Primary)  [N39.0] Urinary tract infection without hematuria, site unspecified  [N20.0] Kidney stone - Right-sided        ED Disposition Condition    Observation Stable                Ricardo Ga Jr., DO  09/17/23 1923       Ricardo Ga Jr.,   09/17/23 1926

## 2023-09-18 ENCOUNTER — ANESTHESIA EVENT (OUTPATIENT)
Dept: SURGERY | Facility: HOSPITAL | Age: 43
End: 2023-09-18
Payer: COMMERCIAL

## 2023-09-18 ENCOUNTER — ANESTHESIA (OUTPATIENT)
Dept: SURGERY | Facility: HOSPITAL | Age: 43
End: 2023-09-18
Payer: COMMERCIAL

## 2023-09-18 VITALS
DIASTOLIC BLOOD PRESSURE: 80 MMHG | SYSTOLIC BLOOD PRESSURE: 138 MMHG | RESPIRATION RATE: 18 BRPM | TEMPERATURE: 98 F | OXYGEN SATURATION: 98 % | HEART RATE: 68 BPM | WEIGHT: 149.94 LBS | HEIGHT: 63 IN | BODY MASS INDEX: 26.57 KG/M2

## 2023-09-18 DIAGNOSIS — N13.9 ACUTE UNILATERAL OBSTRUCTIVE UROPATHY: Primary | ICD-10-CM

## 2023-09-18 LAB
ALBUMIN SERPL BCP-MCNC: 3.1 G/DL (ref 3.5–5.2)
ALP SERPL-CCNC: 68 U/L (ref 55–135)
ALT SERPL W/O P-5'-P-CCNC: 22 U/L (ref 10–44)
ANION GAP SERPL CALC-SCNC: 11 MMOL/L (ref 8–16)
AST SERPL-CCNC: 19 U/L (ref 10–40)
BASOPHILS # BLD AUTO: 0.05 K/UL (ref 0–0.2)
BASOPHILS NFR BLD: 0.6 % (ref 0–1.9)
BILIRUB SERPL-MCNC: 0.1 MG/DL (ref 0.1–1)
BUN SERPL-MCNC: 15 MG/DL (ref 6–20)
CALCIUM SERPL-MCNC: 8.5 MG/DL (ref 8.7–10.5)
CHLORIDE SERPL-SCNC: 105 MMOL/L (ref 95–110)
CO2 SERPL-SCNC: 26 MMOL/L (ref 23–29)
CREAT SERPL-MCNC: 0.7 MG/DL (ref 0.5–1.4)
DIFFERENTIAL METHOD: ABNORMAL
EOSINOPHIL # BLD AUTO: 0.5 K/UL (ref 0–0.5)
EOSINOPHIL NFR BLD: 5.4 % (ref 0–8)
ERYTHROCYTE [DISTWIDTH] IN BLOOD BY AUTOMATED COUNT: 13.1 % (ref 11.5–14.5)
EST. GFR  (NO RACE VARIABLE): >60 ML/MIN/1.73 M^2
GLUCOSE SERPL-MCNC: 75 MG/DL (ref 70–110)
HCT VFR BLD AUTO: 33 % (ref 37–48.5)
HGB BLD-MCNC: 10.4 G/DL (ref 12–16)
IMM GRANULOCYTES # BLD AUTO: 0.02 K/UL (ref 0–0.04)
IMM GRANULOCYTES NFR BLD AUTO: 0.2 % (ref 0–0.5)
LYMPHOCYTES # BLD AUTO: 3.1 K/UL (ref 1–4.8)
LYMPHOCYTES NFR BLD: 37.5 % (ref 18–48)
MAGNESIUM SERPL-MCNC: 1.9 MG/DL (ref 1.6–2.6)
MCH RBC QN AUTO: 28.3 PG (ref 27–31)
MCHC RBC AUTO-ENTMCNC: 31.5 G/DL (ref 32–36)
MCV RBC AUTO: 90 FL (ref 82–98)
MONOCYTES # BLD AUTO: 0.8 K/UL (ref 0.3–1)
MONOCYTES NFR BLD: 9 % (ref 4–15)
NEUTROPHILS # BLD AUTO: 3.9 K/UL (ref 1.8–7.7)
NEUTROPHILS NFR BLD: 47.3 % (ref 38–73)
NRBC BLD-RTO: 0 /100 WBC
PHOSPHATE SERPL-MCNC: 3.7 MG/DL (ref 2.7–4.5)
PLATELET # BLD AUTO: 297 K/UL (ref 150–450)
PMV BLD AUTO: 9.3 FL (ref 9.2–12.9)
POTASSIUM SERPL-SCNC: 3.5 MMOL/L (ref 3.5–5.1)
PROT SERPL-MCNC: 6.4 G/DL (ref 6–8.4)
RBC # BLD AUTO: 3.68 M/UL (ref 4–5.4)
SODIUM SERPL-SCNC: 142 MMOL/L (ref 136–145)
WBC # BLD AUTO: 8.29 K/UL (ref 3.9–12.7)

## 2023-09-18 PROCEDURE — 99223 1ST HOSP IP/OBS HIGH 75: CPT | Mod: 25,ICN,, | Performed by: UROLOGY

## 2023-09-18 PROCEDURE — 27201423 OPTIME MED/SURG SUP & DEVICES STERILE SUPPLY: Performed by: UROLOGY

## 2023-09-18 PROCEDURE — 82365 CALCULUS SPECTROSCOPY: CPT | Performed by: UROLOGY

## 2023-09-18 PROCEDURE — 85025 COMPLETE CBC W/AUTO DIFF WBC: CPT | Performed by: NURSE PRACTITIONER

## 2023-09-18 PROCEDURE — 36000706: Performed by: UROLOGY

## 2023-09-18 PROCEDURE — 80053 COMPREHEN METABOLIC PANEL: CPT | Performed by: NURSE PRACTITIONER

## 2023-09-18 PROCEDURE — 96375 TX/PRO/DX INJ NEW DRUG ADDON: CPT

## 2023-09-18 PROCEDURE — 84100 ASSAY OF PHOSPHORUS: CPT | Performed by: NURSE PRACTITIONER

## 2023-09-18 PROCEDURE — 52356 PR CYSTO/URETERO W/LITHOTRIPSY: ICD-10-PCS | Mod: RT,,, | Performed by: UROLOGY

## 2023-09-18 PROCEDURE — C1769 GUIDE WIRE: HCPCS | Performed by: UROLOGY

## 2023-09-18 PROCEDURE — 71000039 HC RECOVERY, EACH ADD'L HOUR: Performed by: UROLOGY

## 2023-09-18 PROCEDURE — 94761 N-INVAS EAR/PLS OXIMETRY MLT: CPT

## 2023-09-18 PROCEDURE — D9220A PRA ANESTHESIA: ICD-10-PCS | Mod: CRNA,,, | Performed by: NURSE ANESTHETIST, CERTIFIED REGISTERED

## 2023-09-18 PROCEDURE — 25000003 PHARM REV CODE 250: Performed by: UROLOGY

## 2023-09-18 PROCEDURE — 25000003 PHARM REV CODE 250: Performed by: ANESTHESIOLOGY

## 2023-09-18 PROCEDURE — 63600175 PHARM REV CODE 636 W HCPCS: Performed by: NURSE PRACTITIONER

## 2023-09-18 PROCEDURE — 25500020 PHARM REV CODE 255: Performed by: UROLOGY

## 2023-09-18 PROCEDURE — C1758 CATHETER, URETERAL: HCPCS | Performed by: UROLOGY

## 2023-09-18 PROCEDURE — 37000009 HC ANESTHESIA EA ADD 15 MINS: Performed by: UROLOGY

## 2023-09-18 PROCEDURE — C2617 STENT, NON-COR, TEM W/O DEL: HCPCS | Performed by: UROLOGY

## 2023-09-18 PROCEDURE — 63600175 PHARM REV CODE 636 W HCPCS: Performed by: ANESTHESIOLOGY

## 2023-09-18 PROCEDURE — 71000033 HC RECOVERY, INTIAL HOUR: Performed by: UROLOGY

## 2023-09-18 PROCEDURE — 52356 CYSTO/URETERO W/LITHOTRIPSY: CPT | Mod: RT,,, | Performed by: UROLOGY

## 2023-09-18 PROCEDURE — 74420 UROGRAPHY RTRGR +-KUB: CPT | Mod: 26,,, | Performed by: UROLOGY

## 2023-09-18 PROCEDURE — 83735 ASSAY OF MAGNESIUM: CPT | Performed by: NURSE PRACTITIONER

## 2023-09-18 PROCEDURE — 37000008 HC ANESTHESIA 1ST 15 MINUTES: Performed by: UROLOGY

## 2023-09-18 PROCEDURE — D9220A PRA ANESTHESIA: ICD-10-PCS | Mod: ANES,,, | Performed by: ANESTHESIOLOGY

## 2023-09-18 PROCEDURE — G0378 HOSPITAL OBSERVATION PER HR: HCPCS

## 2023-09-18 PROCEDURE — 36000707: Performed by: UROLOGY

## 2023-09-18 PROCEDURE — 74420 PR  X-RAY RETROGRADE PYELOGRAM: ICD-10-PCS | Mod: 26,,, | Performed by: UROLOGY

## 2023-09-18 PROCEDURE — 99223 PR INITIAL HOSPITAL CARE,LEVL III: ICD-10-PCS | Mod: 25,ICN,, | Performed by: UROLOGY

## 2023-09-18 PROCEDURE — D9220A PRA ANESTHESIA: Mod: CRNA,,, | Performed by: NURSE ANESTHETIST, CERTIFIED REGISTERED

## 2023-09-18 PROCEDURE — 25000003 PHARM REV CODE 250: Performed by: NURSE PRACTITIONER

## 2023-09-18 PROCEDURE — 63600175 PHARM REV CODE 636 W HCPCS: Performed by: NURSE ANESTHETIST, CERTIFIED REGISTERED

## 2023-09-18 PROCEDURE — D9220A PRA ANESTHESIA: Mod: ANES,,, | Performed by: ANESTHESIOLOGY

## 2023-09-18 PROCEDURE — 25000003 PHARM REV CODE 250: Performed by: NURSE ANESTHETIST, CERTIFIED REGISTERED

## 2023-09-18 PROCEDURE — 96376 TX/PRO/DX INJ SAME DRUG ADON: CPT

## 2023-09-18 PROCEDURE — 36415 COLL VENOUS BLD VENIPUNCTURE: CPT | Performed by: NURSE PRACTITIONER

## 2023-09-18 DEVICE — STENT SET URETERAL 6X24CM: Type: IMPLANTABLE DEVICE | Site: URETER | Status: FUNCTIONAL

## 2023-09-18 RX ORDER — CEFDINIR 300 MG/1
300 CAPSULE ORAL EVERY 12 HOURS
Qty: 10 CAPSULE | Refills: 0 | Status: SHIPPED | OUTPATIENT
Start: 2023-09-18 | End: 2023-09-23

## 2023-09-18 RX ORDER — FAMOTIDINE 10 MG/ML
20 INJECTION INTRAVENOUS 2 TIMES DAILY
Status: DISCONTINUED | OUTPATIENT
Start: 2023-09-18 | End: 2023-09-18 | Stop reason: HOSPADM

## 2023-09-18 RX ORDER — EPHEDRINE SULFATE 50 MG/ML
INJECTION, SOLUTION INTRAVENOUS
Status: DISCONTINUED | OUTPATIENT
Start: 2023-09-18 | End: 2023-09-18

## 2023-09-18 RX ORDER — DIPHENHYDRAMINE HYDROCHLORIDE 50 MG/ML
25 INJECTION INTRAMUSCULAR; INTRAVENOUS EVERY 6 HOURS PRN
Status: DISCONTINUED | OUTPATIENT
Start: 2023-09-18 | End: 2023-09-18 | Stop reason: HOSPADM

## 2023-09-18 RX ORDER — MIDAZOLAM HYDROCHLORIDE 1 MG/ML
INJECTION INTRAMUSCULAR; INTRAVENOUS
Status: DISCONTINUED | OUTPATIENT
Start: 2023-09-18 | End: 2023-09-18

## 2023-09-18 RX ORDER — KETOROLAC TROMETHAMINE 30 MG/ML
INJECTION, SOLUTION INTRAMUSCULAR; INTRAVENOUS
Status: DISCONTINUED | OUTPATIENT
Start: 2023-09-18 | End: 2023-09-18

## 2023-09-18 RX ORDER — HYDROMORPHONE HYDROCHLORIDE 2 MG/ML
0.2 INJECTION, SOLUTION INTRAMUSCULAR; INTRAVENOUS; SUBCUTANEOUS EVERY 5 MIN PRN
Status: DISCONTINUED | OUTPATIENT
Start: 2023-09-18 | End: 2023-09-18 | Stop reason: HOSPADM

## 2023-09-18 RX ORDER — FENTANYL CITRATE 50 UG/ML
INJECTION, SOLUTION INTRAMUSCULAR; INTRAVENOUS
Status: DISCONTINUED | OUTPATIENT
Start: 2023-09-18 | End: 2023-09-18

## 2023-09-18 RX ORDER — SCOLOPAMINE TRANSDERMAL SYSTEM 1 MG/1
1 PATCH, EXTENDED RELEASE TRANSDERMAL
Status: DISCONTINUED | OUTPATIENT
Start: 2023-09-18 | End: 2023-09-18 | Stop reason: HOSPADM

## 2023-09-18 RX ORDER — PHENYLEPHRINE HYDROCHLORIDE 10 MG/ML
INJECTION INTRAVENOUS
Status: DISCONTINUED | OUTPATIENT
Start: 2023-09-18 | End: 2023-09-18

## 2023-09-18 RX ORDER — MEPERIDINE HYDROCHLORIDE 50 MG/ML
12.5 INJECTION INTRAMUSCULAR; INTRAVENOUS; SUBCUTANEOUS ONCE
Status: DISCONTINUED | OUTPATIENT
Start: 2023-09-18 | End: 2023-09-18 | Stop reason: HOSPADM

## 2023-09-18 RX ORDER — OXYCODONE HYDROCHLORIDE 5 MG/1
5 TABLET ORAL
Status: DISCONTINUED | OUTPATIENT
Start: 2023-09-18 | End: 2023-09-18 | Stop reason: HOSPADM

## 2023-09-18 RX ORDER — FENTANYL CITRATE 50 UG/ML
25 INJECTION, SOLUTION INTRAMUSCULAR; INTRAVENOUS EVERY 5 MIN PRN
Status: COMPLETED | OUTPATIENT
Start: 2023-09-18 | End: 2023-09-18

## 2023-09-18 RX ORDER — ONDANSETRON HYDROCHLORIDE 2 MG/ML
INJECTION, SOLUTION INTRAMUSCULAR; INTRAVENOUS
Status: DISCONTINUED | OUTPATIENT
Start: 2023-09-18 | End: 2023-09-18

## 2023-09-18 RX ORDER — PROPOFOL 10 MG/ML
VIAL (ML) INTRAVENOUS
Status: DISCONTINUED | OUTPATIENT
Start: 2023-09-18 | End: 2023-09-18

## 2023-09-18 RX ORDER — DEXAMETHASONE SODIUM PHOSPHATE 4 MG/ML
INJECTION, SOLUTION INTRA-ARTICULAR; INTRALESIONAL; INTRAMUSCULAR; INTRAVENOUS; SOFT TISSUE
Status: DISCONTINUED | OUTPATIENT
Start: 2023-09-18 | End: 2023-09-18

## 2023-09-18 RX ORDER — ONDANSETRON 2 MG/ML
4 INJECTION INTRAMUSCULAR; INTRAVENOUS ONCE
Status: DISCONTINUED | OUTPATIENT
Start: 2023-09-18 | End: 2023-09-18 | Stop reason: HOSPADM

## 2023-09-18 RX ORDER — LIDOCAINE HYDROCHLORIDE 20 MG/ML
INJECTION INTRAVENOUS
Status: DISCONTINUED | OUTPATIENT
Start: 2023-09-18 | End: 2023-09-18

## 2023-09-18 RX ORDER — PHENAZOPYRIDINE HYDROCHLORIDE 200 MG/1
200 TABLET, FILM COATED ORAL ONCE
Status: COMPLETED | OUTPATIENT
Start: 2023-09-18 | End: 2023-09-18

## 2023-09-18 RX ADMIN — ONDANSETRON 4 MG: 2 INJECTION INTRAMUSCULAR; INTRAVENOUS at 05:09

## 2023-09-18 RX ADMIN — SODIUM CHLORIDE, SODIUM GLUCONATE, SODIUM ACETATE, POTASSIUM CHLORIDE AND MAGNESIUM CHLORIDE: 526; 502; 368; 37; 30 INJECTION, SOLUTION INTRAVENOUS at 11:09

## 2023-09-18 RX ADMIN — EPHEDRINE SULFATE 25 MG: 50 INJECTION, SOLUTION INTRAMUSCULAR; INTRAVENOUS; SUBCUTANEOUS at 01:09

## 2023-09-18 RX ADMIN — ARIPIPRAZOLE 10 MG: 5 TABLET ORAL at 02:09

## 2023-09-18 RX ADMIN — ONDANSETRON 4 MG: 2 INJECTION INTRAMUSCULAR; INTRAVENOUS at 12:09

## 2023-09-18 RX ADMIN — PHENYLEPHRINE HYDROCHLORIDE 100 MCG: 10 INJECTION INTRAVENOUS at 01:09

## 2023-09-18 RX ADMIN — FENTANYL CITRATE 25 MCG: 50 INJECTION INTRAMUSCULAR; INTRAVENOUS at 02:09

## 2023-09-18 RX ADMIN — LIDOCAINE HYDROCHLORIDE 75 MG: 20 INJECTION, SOLUTION INTRAVENOUS at 12:09

## 2023-09-18 RX ADMIN — PHENYLEPHRINE HYDROCHLORIDE 100 MCG: 10 INJECTION INTRAVENOUS at 12:09

## 2023-09-18 RX ADMIN — MIDAZOLAM HYDROCHLORIDE 2 MG: 1 INJECTION, SOLUTION INTRAMUSCULAR; INTRAVENOUS at 12:09

## 2023-09-18 RX ADMIN — CEFTRIAXONE SODIUM 1 G: 1 INJECTION, POWDER, FOR SOLUTION INTRAMUSCULAR; INTRAVENOUS at 05:09

## 2023-09-18 RX ADMIN — DEXAMETHASONE SODIUM PHOSPHATE 8 MG: 4 INJECTION, SOLUTION INTRA-ARTICULAR; INTRALESIONAL; INTRAMUSCULAR; INTRAVENOUS; SOFT TISSUE at 12:09

## 2023-09-18 RX ADMIN — PHENAZOPYRIDINE 200 MG: 200 TABLET ORAL at 02:09

## 2023-09-18 RX ADMIN — PROPOFOL 150 MG: 10 INJECTION, EMULSION INTRAVENOUS at 12:09

## 2023-09-18 RX ADMIN — FAMOTIDINE 20 MG: 10 INJECTION, SOLUTION INTRAVENOUS at 11:09

## 2023-09-18 RX ADMIN — PHENYLEPHRINE HYDROCHLORIDE 200 MCG: 10 INJECTION INTRAVENOUS at 01:09

## 2023-09-18 RX ADMIN — MORPHINE SULFATE 4 MG: 4 INJECTION, SOLUTION INTRAMUSCULAR; INTRAVENOUS at 05:09

## 2023-09-18 RX ADMIN — FENTANYL CITRATE 50 MCG: 0.05 INJECTION, SOLUTION INTRAMUSCULAR; INTRAVENOUS at 12:09

## 2023-09-18 RX ADMIN — KETOROLAC TROMETHAMINE 30 MG: 30 INJECTION, SOLUTION INTRAMUSCULAR; INTRAVENOUS at 01:09

## 2023-09-18 RX ADMIN — SCOPALAMINE 1 PATCH: 1 PATCH, EXTENDED RELEASE TRANSDERMAL at 11:09

## 2023-09-18 RX ADMIN — MORPHINE SULFATE 4 MG: 4 INJECTION, SOLUTION INTRAMUSCULAR; INTRAVENOUS at 09:09

## 2023-09-18 NOTE — CONSULTS
"  [facility]/Surg  Adult Nutrition  Consult Note    SUMMARY     Recommendations   1) Advance PO diet to cardiac s/p procedure   2) weigh weekly    Goals: 1) PO intakes > 50% of meals and supplements at f/u  Nutrition Goal Status: new  Communication of RD Recs:  (POC, sticky note)    Assessment and Plan    Inadequate protein-energy intake  R/t N/V and NPO  As evidenced by PO intakes < 75% of needs x 2-3 days  Intervention: collaboration with other providers  Improving    Does not meet 2 ASPEN criteria for malnutrition    Malnutrition Assessment     Skin (Micronutrient):  (Po = 22)   Micronutrient Evaluation Summary: suspected deficiency  Micronutrient Evaluation Comments: check iron                             Reason for Assessment    Reason For Assessment: consult  Diagnosis:  (acute unilateral obstructive uropathy)  Relevant Medical History: anxiety, HLD, smoking, bipolar, GERD  Interdisciplinary Rounds: did not attend    General Information Comments: 42 y/o female admitted with obstructive uropathy, down at pre-op. This morning pt tells me she had some N/V prior to Sunday but none since, she ate [employer] that was brought in for her by her  last night. Prior to illness she was eating with good appetite at home. NFPE WDL. Wt gain per chart review.    Nutrition Discharge Planning: To be determined- Cardiac diet    Nutrition Risk Screen    Nutrition Risk Screen: no indicators present    Nutrition/Diet History    Patient Reported Diet/Restrictions/Preferences: general  Spiritual, Cultural Beliefs, Oriental orthodox Practices, Values that Affect Care: no  Food Allergies: NKFA  Factors Affecting Nutritional Intake: NPO    Anthropometrics    Temp: 97.7 °F (36.5 °C)  Height Method: Stated  Height: 5' 3" (160 cm)  Height (inches): 63 in  Weight Method: Bed Scale  Weight: 68 kg (149 lb 14.6 oz)  Weight (lb): 149.91 lb  Ideal Body Weight (IBW), Female: 115 lb  % Ideal Body Weight, Female (lb): 130.36 %  BMI " (Calculated): 26.6  BMI Grade: 25 - 29.9 - overweight       Lab/Procedures/Meds    Pertinent Labs Reviewed: reviewed  BMP  Lab Results   Component Value Date     09/18/2023    K 3.5 09/18/2023     09/18/2023    CO2 26 09/18/2023    BUN 15 09/18/2023    CREATININE 0.7 09/18/2023    CALCIUM 8.5 (L) 09/18/2023    ANIONGAP 11 09/18/2023    EGFRNORACEVR >60 09/18/2023     Lab Results   Component Value Date    ALBUMIN 3.1 (L) 09/18/2023       Pertinent Medications Reviewed: reviewed  Pertinent Medications Comments: NS @ 75 ml/hr, zofran, KNaPhos, prochlorperazine, Kbicarb    Estimated/Assessed Needs    Weight Used For Calorie Calculations: 68 kg (149 lb 14.6 oz)  Energy Calorie Requirements (kcal): MSj ( x 1.2) = 1550 kcal  Energy Need Method: [place]-St or  Protein Requirements: 0.8-1 g protein/kg ( 54-68 g)  Weight Used For Protein Calculations: 68 kg (149 lb 14.6 oz)  Fluid Requirements (mL): 1550 ml or per MD  Estimated Fluid Requirement Method: RDA Method  CHO Requirement: N/A      Nutrition Prescription Ordered    Current Diet Order: NPO x < 1 day    Evaluation of Received Nutrient/Fluid Intake    Energy Calories Required: not meeting needs  Protein Required: not meeting needs  Fluid Required: not meeting needs  Tolerance: other (see comments) (NPO)     Intake/Output Summary (Last 24 hours) at 9/18/2023 1109  Last data filed at 9/18/2023 0703  Gross per 24 hour   Intake 724.97 ml   Output --   Net 724.97 ml       % Intake of Estimated Energy Needs: 0%  % Meal Intake: NPO    Nutrition Risk    Level of Risk/Frequency of Follow-up:  (1 x weekly)       Monitor and Evaluation    Food and Nutrient Intake: energy intake, food and beverage intake  Food and Nutrient Adminstration: diet order  Anthropometric Measurements: weight  Biochemical Data, Medical Tests and Procedures: electrolyte and renal panel, gastrointestinal profile  Nutrition-Focused Physical Findings: overall appearance       Nutrition  Follow-Up    RD Follow-up?: Yes

## 2023-09-18 NOTE — PLAN OF CARE
Hospital follow up scheduled with PCP for 9/25/23 @ 2:00. Added to AVS    In basket message sent to Dr. Ivan' staff to schedule post op appt.     09/18/23 1404   Post-Acute Status   Hospital Resources/Appts/Education Provided Appointments scheduled and added to AVS

## 2023-09-18 NOTE — DISCHARGE INSTRUCTIONS
VERY IMPORTANT - PLEASE READ    Your urologist is Dr. Miki Ivan  Office number: [phone] ([facility])  Address: [address] [place] [address],  (2nd office building on left); [address] (located on 2nd floor).     What Dr. Ivan did today: Cysto, right ureteral stent placement, right ureteroscopy with laser lithotripsy     If you do not hear from us please call clinic to make a post-op appointment.   The following are specific instructions for you, so please read:    The ureter is the tube that drains the kidney (where urine is made). It connects the kidney to the bladder (where urine is stored).     A ureteral stent is a is a soft plastic tube with holes in tube that bypasses anything that obstructs (stone, tumor, scar tissue) the urine from flow from the kidney to the bladder. It is placed by going through the urethra and into the bladder. No incisions are made. Its temporarily inserted into a ureter to help drain urine into the bladder. One end goes in the kidney. The other end goes in the bladder. A coil on each end holds the stent in place. The stent cant be seen from outside the body.          You have a ureteral stent in your RIGHT kidney that was placed on 09/18/2023  -the stent can only remain for a maximum of 3 to 6 months. If the stent remains longer than this you can get recurrent urinary tract infections, the stent can have more stones form along it and urine will not be able to drain and you will lose all function of your kidney requiring a major surgery and a big incision to remove the kidney.       You have a string attached to the stent:  -you can remove the stent on ____9/25/23_____ at 8 am  -it will be attached to the outside of your vagina (if you are a female) or to the penis (if you are male)  -if the stent is partially or accidentally pulled out you will leak urine until the stent is removed because urine is now coming directly from your kidney and bypassing the urethra. Go ahead and  remove it if this happens but expect pain for 24 to 48 hours or more after stent removed, especially if it was removed earlier than expected.  -If you have not heard from anyone about when to pull the string and remove the stent, remove it at two weeks after your surgery by pulling the string. DO NOT cut the string.   -When you remove the string, you should see a small plastic tube about the size of a cocktail straw and about 12 inches long attached to a string that looks like dental floss. If you do not see this, please let call the office and let us know.   - Ok to shower in 2 days.       If you do not receive a follow-up date or further instructions on what is to be done next about the stent, it is your responsibility to make sure that you have follow-up to have your stone or stent removed.     A stent CANNOT REMAIN indefinitely in the kidney. It should not remain if possible more than 3 to 6 months maximum (rarely 1 year if it was placed for cancer).     If you do not follow-up to have your stent removed then you can LOSE YOUR KIDNEY FUNCTION ON THAT SIDE, get RECURRENT URINARY TRACT INFECTIONS,and MORE STONES requiring SURGICAL OPEN removal of your kidney.    You can call our office ([facility] at [phone] and let them know a stent was placed and you need further instructions for follow-up). If there are issues with insurance we will work with you to help you arrange follow-up where it can be removed. Again,  A STENT CANNOT remain indefinitely.       A ureteral stent is left in place for many reasons:  -to keep the ureter open after a procedure as there will be much inflammation and if no stent is left you will experience the same pain as having the stone that caused the obstruction.   -to drain the kidney of infection.  -if the stone is up high, stuck, or you have an infection, the stent will stretch open the small ureter (the tube that drains the kidney) for a few weeks (usually 2 to 4  weeks) so that we can return and place instruments into this tube to break up and remove the stone.      Ureteral Stent Symptoms can include but are not limited to   - pain in the kidney or bladder with urination during or especially at the end of voiding.   - constant urge to urinate, frequency of urination, severe bladder spasms and severe pain the kidney, especially during urination.  - blood in the urine, especially with increased activity. This can last the entire time the stent is in, it can sometimes clear and return or you may never have any at all. I recommend increasing fluid intake to prevent large clots that may be difficult to urinate out.      When to go to ER:   -fever >101.5 or inability to urinate (no urination for >4 hours and bladder pain).   -If you go to the ER with pain, they may check to make sure the stent is in good position with an x-ray or ultrasound but unlikely to do anything else.   -I will let you know when we will plan to have the stent either removed at the ambulatory surgical center as an outpatient procedure while you are awake, which is uncomfortable briefly, but not painful or you will remove it yourself by pulling the strings.

## 2023-09-18 NOTE — PLAN OF CARE
Pt clear for DC from CM standpoint. Discharging home.       09/18/23 1502   Final Note   Assessment Type Final Discharge Note   Anticipated Discharge Disposition Home

## 2023-09-18 NOTE — CONSULTS
Interprofessional Telephone CONSULT Note  Date of Service: 09/17/2023  Patient's location: [facility]     Specialty Consulted: Urology  Staff Consulted: Magdalena Isaac MD  Reason for Consult: right ureteral stone     Time spent reviewing records, making plan and formulating plan: 35 min. More than half the time was devoted to medical consultative verbal/internet discussion with nursing and consulting physician. The purpose of this note was to treat and evaluate patient and not to arrange a transfer of care or other face to face service.       This service was not originating from a related E/M service provided within the previous 7 days nor will  to an E/M service or procedure within the next 24 hours or my soonest available appointment.      Both a written plan and a verbal/internet discussion were discussed with the following physician consultant: MD Elvis  15350 5-10 min  88661 11 to 20 min  77793 21 to 30 min  96200 31 min           [facility] Consult Note - [facility]  Staff: MD Marlin  Group:Marlin/Vanessa/Moncho/Katlyn/Bert  Referring provider and please cc:    PCP: Ivan Meek MD  Date of Service: 09/17/2023    CC: stone      Subjective:      Initial consult by me in hospital on 9/17/23 for kidney stone:     Caitlin Delaney is a 43 y.o. female admitted for No admission diagnoses are documented for this encounter. on 9/17/2023.     Patient presented to the ER on 09/12/2023 was severe right lower quadrant pain had been present 5 days.  White count was 12.  Creatinine 0.7.  Urine showed 1+ blood will red blood cells and leukocytes as well as squames.  CT renal stone study showed a 7 mm distal ureteral stone on the right with severe upstream hydro.  No other stones.  She was sent home with antibiotics/Cipro, Flomax for trial of passage.    She returned to the ER today with severe and worsening right flank pain.  We tea labs showed a  "white count of 8, creatinine of 0.7 negative urine.  Repeat CT renal stone study showed the stone was in the exact same location.    Denies any fevers at home. Lactate elevated at 1.6    Pt to be admitted to medicine for pain control and possible sotne    Ctrss 9/12/23      Ctrss 9/17/23        Urine history:   9/17/23 Neg, 2 rbc/9 wbc/rare bact/rare yeast/13 squames  9/12/23 Coag neg staph, 1+leuk/1+bld, 9 rbc/55 wbc/few bact/4 sq    Recent Labs   Lab 09/12/23 2211 09/17/23  1750   WBC 12.02 8.36   Hemoglobin 11.4 L 11.7 L   Hematocrit 34.2 L 36.3 L   Platelets 240 325   ]  Recent Labs   Lab 09/12/23 2211 09/17/23  1750   Sodium 140 142   Potassium 3.8 3.4 L   Chloride 103 103   CO2 27 25   BUN 11 12   Creatinine 0.7 0.7   Glucose 80 92   Calcium 8.8 9.9   Alkaline Phosphatase 85 86   Total Protein 7.5 8.1   Albumin 3.7 3.9   Total Bilirubin 0.3 0.2   AST 14 18   ALT 18 25   ]    No results found for: "LABA1C", "HGBA1C"      Current REVIEW OF SYSTEMS:  Negative for the remaining 12 points of ROS except for as stated above       PMHx:  Past Medical History:   Diagnosis Date    Bipolar disorder, unspecified     GERD (gastroesophageal reflux disease)        PSHx:  Past Surgical History:   Procedure Laterality Date    1 x csection      complete hysterectomy      cyst removed      foot surgery x 2 times         Fam Hx:  Reviewed- pertinent family hx as above    Soc Hx:  Social History     Tobacco Use    Smoking status: Every Day     Types: Cigars    Smokeless tobacco: Never   Substance Use Topics    Alcohol use: Yes     Comment: occ    Drug use: Never       Allergies:  Patient has no known allergies.      Objective:     Vitals:    09/17/23 1838   BP: 127/74   Pulse: 73   Resp:    Temp:          General:wdwn  in NAD  Neurologic: CN grossly normal. Normal sensation.   Psychiatric: awake, alert and oriented x 3. Mood and affect Normal. Cooperative.  Eyes: PERRLA, normal conjunctiva  Respiratory: no increased work on " breathing. No wheezing.   Cardiovascular: No obvious extremity edema. Warm and well perfused.  GI: no obvious stomach distension  Musculoskeletal: normal  range of motion of bilateral upper extremities. Normal muscle strength and tone.  Skin: no obvious rashes or lesions. No tightening of skin noted.    Pertinent  exam in HPI        Assessment:     Caitlin Delaney is a 43 y.o. female with   1. Right ureteral stone    2. Sepsis        urology consulted for:  right ureteral stone without evidence of urinary tract infection,  without renal insufficiency, with intractable pain. No uti.       Plan:     To OR tomorrow or Tuesday possibly for cystoscopy and right stent placement with possible stone extraction with .   If a stent is unable to be placed the patient was told that they would need a nephrostomy tube placed by Radiology in a separate setting in order to drain the kidney  We discussed the difference in stone treatment options and why we are proceeding with this treatment plan.    If infection/pus is seen proximal/behind the stone after placement of wire, then the pt will only have a ureteral stent and a catheter placed to maximally drain infection. They will be admitted for IV abx and the atkinson will remain until patient's white blood cell count is normal/lower and they do not demonstrate any fevers for 24 hours.   then we will return in 2 to 4 weeks to do a stone extraction with or without laser once infection has been cleared. At that procedure the old stent will be removed and the stone/s will be fragmented and removed and a new ureteral stent replaced but likely on strings so the patient can remove themselves at a designated time so as to avoid another procedure to have the stent removed    If no infection is seen, will attempt to reach stone, laser/fragment it if necessary and then basket out the fragments.  A stent will be placed likely on strings to be removed at the designated time by the  patient so as not to have to need another procedure to remove the stent.  it was explained to the patient that it is very easy to remove the stent and does not painful to the remove the stent but in fact the pain usually occurs within a few hours after the stent is removed and is managed with pain medicine.    Special considerations for this patient:   Will need to follow up in [place] for stone extraction if only able to place stent.       Stent consent:  The patient was counseled extensively on the stent being only temporary. The pt understands that a stent should not be in longer than 1 year, and preferably less than 3 months. A stent can become encrusted, block urine flow and cause kidney function loss if it is not exchanged or removed at least within a year.  They understand that if the stent remains longer becomes encrusted it would require major surgeries and possible nephrectomy to remove the stent and or stones or affected kidney if unable to treat the stones endoscopically.      Information will also be given on the discharge instructions on how to get in contact with urologist to determine plan for stone/stent removal versus exchange.        Magdalena Isaac MD

## 2023-09-18 NOTE — CONSULTS
[facility] Urology New Patient/H&P:    Caitlin Delaney is a 43 y.o. female who presents for right urolithiasis.     Patient presented to the ER on 09/12/2023 with severe right lower quadrant pain that had been present for 5 days.  White count was 12.  Creatinine 0.7.  Urine showed 1+ blood will red blood cells and leukocytes as well as squames.  CT renal stone study showed a 7 mm distal ureteral stone on the right with severe upstream hydro.  No other stones.  She was sent home with antibiotics/Cipro, Flomax for trial of passage.     She returned to the ER on 9/17/23 with severe and worsening right flank pain.  Repeat labs showed a white count of 8, creatinine of 0.7 negative urine.  Repeat CT renal stone study showed the stone was in the exact same location. Urology consulted by Dr. Pierce to assist with management.     No prior history of kidney stones.     Denies any fever, chills, gross hematuria, bone pain, unintentional weight loss,  trauma or history of  malignancy.       Urine culture  Coag neg Venessa 9/12/23      Past Medical History:   Diagnosis Date    Bipolar disorder, unspecified     GERD (gastroesophageal reflux disease)        Past Surgical History:   Procedure Laterality Date    1 x csection      complete hysterectomy      cyst removed      foot surgery x 2 times         Family History   Family history unknown: Yes       Review of patient's allergies indicates:  No Known Allergies    Medications Reviewed: see MAR    PHYSICAL EXAM:    Vitals:    09/18/23 1035   BP: 127/76   Pulse: 62   Resp: 18   Temp: 97.7 °F (36.5 °C)     Body mass index is 26.56 kg/m².       General: Alert, cooperative, no distress, appears stated age  Abdomen: Soft, non-tender, no CVA tenderness, non-distended      LABS:    Recent Results (from the past 336 hour(s))   Urinalysis, Reflex to Urine Culture Urine, Clean Catch    Collection Time: 09/12/23  8:35 PM    Specimen: Urine   Result Value Ref Range     Specimen UA Urine, Clean Catch     Color, UA Yellow Yellow, Straw, Radha    Appearance, UA Hazy (A) Clear    pH, UA 6.0 5.0 - 8.0    Specific Gravity, UA 1.020 1.005 - 1.030    Protein, UA Trace (A) Negative    Glucose, UA Negative Negative    Ketones, UA Negative Negative    Bilirubin (UA) Negative Negative    Occult Blood UA 1+ (A) Negative    Nitrite, UA Negative Negative    Urobilinogen, UA Negative Negative EU/dL    Leukocytes, UA 1+ (A) Negative   Urinalysis Microscopic    Collection Time: 09/12/23  8:35 PM   Result Value Ref Range    RBC, UA 9 (H) 0 - 4 /hpf    WBC, UA 55 (H) 0 - 5 /hpf    Bacteria Few (A) None-Occ /hpf    Squam Epithel, UA 4 /hpf    Microscopic Comment SEE COMMENT    Urine culture    Collection Time: 09/12/23  8:35 PM    Specimen: Urine   Result Value Ref Range    Urine Culture, Routine (A)      COAGULASE-NEGATIVE STAPHYLOCOCCUS SPECIES  10,000 - 49,999 cfu/ml  Susceptibility testing not routinely performed.     Complete Blood Count (CBC)    Collection Time: 09/12/23 10:11 PM   Result Value Ref Range    WBC 12.02 3.90 - 12.70 K/uL    RBC 3.79 (L) 4.00 - 5.40 M/uL    Hemoglobin 11.4 (L) 12.0 - 16.0 g/dL    Hematocrit 34.2 (L) 37.0 - 48.5 %    MCV 90 82 - 98 fL    MCH 30.1 27.0 - 31.0 pg    MCHC 33.3 32.0 - 36.0 g/dL    RDW 13.6 11.5 - 14.5 %    Platelets 240 150 - 450 K/uL    MPV 9.5 9.2 - 12.9 fL    Immature Granulocytes 0.2 0.0 - 0.5 %    Gran # (ANC) 7.5 1.8 - 7.7 K/uL    Immature Grans (Abs) 0.03 0.00 - 0.04 K/uL    Lymph # 3.1 1.0 - 4.8 K/uL    Mono # 0.9 0.3 - 1.0 K/uL    Eos # 0.4 0.0 - 0.5 K/uL    Baso # 0.05 0.00 - 0.20 K/uL    nRBC 0 0 /100 WBC    Gran % 62.7 38.0 - 73.0 %    Lymph % 25.5 18.0 - 48.0 %    Mono % 7.7 4.0 - 15.0 %    Eosinophil % 3.5 0.0 - 8.0 %    Basophil % 0.4 0.0 - 1.9 %    Differential Method Automated    Comprehensive Metabolic Panel (CMP)    Collection Time: 09/12/23 10:11 PM   Result Value Ref Range    Sodium 140 136 - 145 mmol/L    Potassium 3.8 3.5 - 5.1  mmol/L    Chloride 103 95 - 110 mmol/L    CO2 27 23 - 29 mmol/L    Glucose 80 70 - 110 mg/dL    BUN 11 6 - 20 mg/dL    Creatinine 0.7 0.5 - 1.4 mg/dL    Calcium 8.8 8.7 - 10.5 mg/dL    Total Protein 7.5 6.0 - 8.4 g/dL    Albumin 3.7 3.5 - 5.2 g/dL    Total Bilirubin 0.3 0.1 - 1.0 mg/dL    Alkaline Phosphatase 85 55 - 135 U/L    AST 14 10 - 40 U/L    ALT 18 10 - 44 U/L    eGFR >60.0 >60 mL/min/1.73 m^2    Anion Gap 10 8 - 16 mmol/L   Urinalysis, Reflex to Urine Culture Urine, Clean Catch    Collection Time: 09/17/23  5:20 PM    Specimen: Urine   Result Value Ref Range    Specimen UA Urine, Clean Catch     Color, UA Yellow Yellow, Straw, Radha    Appearance, UA Hazy (A) Clear    pH, UA 7.0 5.0 - 8.0    Specific Gravity, UA 1.010 1.005 - 1.030    Protein, UA Negative Negative    Glucose, UA Negative Negative    Ketones, UA Negative Negative    Bilirubin (UA) Negative Negative    Occult Blood UA Negative Negative    Nitrite, UA Negative Negative    Urobilinogen, UA Negative <2.0 EU/dL    Leukocytes, UA Trace (A) Negative   Urinalysis Microscopic    Collection Time: 09/17/23  5:20 PM   Result Value Ref Range    RBC, UA 2 0 - 4 /hpf    WBC, UA 9 (H) 0 - 5 /hpf    Bacteria Rare None-Occ /hpf    Yeast, UA Rare (A) None    Squam Epithel, UA 13 /hpf    Microscopic Comment SEE COMMENT    ISTAT Lactate    Collection Time: 09/17/23  5:37 PM   Result Value Ref Range    POC Lactate 1.63 (H) 0.36 - 1.25 mmol/L    Sample CAPILLARY     Site Other     Allens Test N/A     DelSys Room Air    Blood culture x two cultures. Draw prior to antibiotics.    Collection Time: 09/17/23  5:50 PM    Specimen: Peripheral, Left Arm; Blood   Result Value Ref Range    Blood Culture, Routine No Growth to date    Blood culture x two cultures. Draw prior to antibiotics.    Collection Time: 09/17/23  5:50 PM    Specimen: Peripheral, Right Hand; Blood   Result Value Ref Range    Blood Culture, Routine No Growth to date    CBC auto differential    Collection  Time: 09/17/23  5:50 PM   Result Value Ref Range    WBC 8.36 3.90 - 12.70 K/uL    RBC 4.03 4.00 - 5.40 M/uL    Hemoglobin 11.7 (L) 12.0 - 16.0 g/dL    Hematocrit 36.3 (L) 37.0 - 48.5 %    MCV 90 82 - 98 fL    MCH 29.0 27.0 - 31.0 pg    MCHC 32.2 32.0 - 36.0 g/dL    RDW 13.2 11.5 - 14.5 %    Platelets 325 150 - 450 K/uL    MPV 9.2 9.2 - 12.9 fL    Immature Granulocytes 0.2 0.0 - 0.5 %    Gran # (ANC) 5.2 1.8 - 7.7 K/uL    Immature Grans (Abs) 0.02 0.00 - 0.04 K/uL    Lymph # 2.3 1.0 - 4.8 K/uL    Mono # 0.6 0.3 - 1.0 K/uL    Eos # 0.2 0.0 - 0.5 K/uL    Baso # 0.03 0.00 - 0.20 K/uL    nRBC 0 0 /100 WBC    Gran % 62.5 38.0 - 73.0 %    Lymph % 27.3 18.0 - 48.0 %    Mono % 6.8 4.0 - 15.0 %    Eosinophil % 2.8 0.0 - 8.0 %    Basophil % 0.4 0.0 - 1.9 %    Differential Method Automated    Comprehensive metabolic panel    Collection Time: 09/17/23  5:50 PM   Result Value Ref Range    Sodium 142 136 - 145 mmol/L    Potassium 3.4 (L) 3.5 - 5.1 mmol/L    Chloride 103 95 - 110 mmol/L    CO2 25 23 - 29 mmol/L    Glucose 92 70 - 110 mg/dL    BUN 12 6 - 20 mg/dL    Creatinine 0.7 0.5 - 1.4 mg/dL    Calcium 9.9 8.7 - 10.5 mg/dL    Total Protein 8.1 6.0 - 8.4 g/dL    Albumin 3.9 3.5 - 5.2 g/dL    Total Bilirubin 0.2 0.1 - 1.0 mg/dL    Alkaline Phosphatase 86 55 - 135 U/L    AST 18 10 - 40 U/L    ALT 25 10 - 44 U/L    eGFR >60 >60 mL/min/1.73 m^2    Anion Gap 14 8 - 16 mmol/L   APTT    Collection Time: 09/17/23  5:50 PM   Result Value Ref Range    aPTT 26.1 21.0 - 32.0 sec   Protime-INR    Collection Time: 09/17/23  5:50 PM   Result Value Ref Range    Prothrombin Time 10.3 9.0 - 12.5 sec    INR 0.9 0.8 - 1.2   Lactic acid, plasma #2    Collection Time: 09/17/23  9:36 PM   Result Value Ref Range    Lactate (Lactic Acid) 0.9 0.5 - 2.2 mmol/L   Comprehensive Metabolic Panel (CMP)    Collection Time: 09/18/23  4:56 AM   Result Value Ref Range    Sodium 142 136 - 145 mmol/L    Potassium 3.5 3.5 - 5.1 mmol/L    Chloride 105 95 - 110 mmol/L     CO2 26 23 - 29 mmol/L    Glucose 75 70 - 110 mg/dL    BUN 15 6 - 20 mg/dL    Creatinine 0.7 0.5 - 1.4 mg/dL    Calcium 8.5 (L) 8.7 - 10.5 mg/dL    Total Protein 6.4 6.0 - 8.4 g/dL    Albumin 3.1 (L) 3.5 - 5.2 g/dL    Total Bilirubin 0.1 0.1 - 1.0 mg/dL    Alkaline Phosphatase 68 55 - 135 U/L    AST 19 10 - 40 U/L    ALT 22 10 - 44 U/L    eGFR >60 >60 mL/min/1.73 m^2    Anion Gap 11 8 - 16 mmol/L   Magnesium    Collection Time: 09/18/23  4:56 AM   Result Value Ref Range    Magnesium 1.9 1.6 - 2.6 mg/dL   Phosphorus    Collection Time: 09/18/23  4:56 AM   Result Value Ref Range    Phosphorus 3.7 2.7 - 4.5 mg/dL   CBC with Automated Differential    Collection Time: 09/18/23  4:56 AM   Result Value Ref Range    WBC 8.29 3.90 - 12.70 K/uL    RBC 3.68 (L) 4.00 - 5.40 M/uL    Hemoglobin 10.4 (L) 12.0 - 16.0 g/dL    Hematocrit 33.0 (L) 37.0 - 48.5 %    MCV 90 82 - 98 fL    MCH 28.3 27.0 - 31.0 pg    MCHC 31.5 (L) 32.0 - 36.0 g/dL    RDW 13.1 11.5 - 14.5 %    Platelets 297 150 - 450 K/uL    MPV 9.3 9.2 - 12.9 fL    Immature Granulocytes 0.2 0.0 - 0.5 %    Gran # (ANC) 3.9 1.8 - 7.7 K/uL    Immature Grans (Abs) 0.02 0.00 - 0.04 K/uL    Lymph # 3.1 1.0 - 4.8 K/uL    Mono # 0.8 0.3 - 1.0 K/uL    Eos # 0.5 0.0 - 0.5 K/uL    Baso # 0.05 0.00 - 0.20 K/uL    nRBC 0 0 /100 WBC    Gran % 47.3 38.0 - 73.0 %    Lymph % 37.5 18.0 - 48.0 %    Mono % 9.0 4.0 - 15.0 %    Eosinophil % 5.4 0.0 - 8.0 %    Basophil % 0.6 0.0 - 1.9 %    Differential Method Automated            Assessment/Diagnosis:    Right distal ureteral stone - 7 mm  Right hydronephrosis  Right flank pain    Plans:    - Discussed the etiology and management of the patient's nephrolithiasis. Explained that stone disease is multifactorial and can be secondary to urinary obstruction, urine composition, low urine volume, diet, hypokalemia, DM, hypertension, gout, RTA, UTI and medications. We discussed that stones can be managed with observation, trial of passage, ureteroscopy  with laser lithotripsy, ESWL and PCNL. After extensive discussion, patient has decided to proceed with urgent right ureteroscopy with laser lithotripsy and stent placement at [facility] today. All risks, benefits and alternatives discussed at length with patient.

## 2023-09-18 NOTE — TRANSFER OF CARE
"Anesthesia Transfer of Care Note    Patient: Caitlin Delaney    Procedure(s) Performed: Procedure(s) (LRB):  CYSTOURETEROSCOPY, WITH RETROGRADE PYELOGRAM AND URETERAL STENT INSERTION (Right)    Patient location: PACU    Anesthesia Type: general    Transport from OR: Transported from OR on 2-3 L/min O2 by NC with adequate spontaneous ventilation    Post pain: adequate analgesia    Post assessment: no apparent anesthetic complications and tolerated procedure well    Post vital signs: stable    Level of consciousness: sedated    Nausea/Vomiting: no nausea/vomiting    Complications: none    Transfer of care protocol was followed      Last vitals:   Visit Vitals  /76 (BP Location: Right arm, Patient Position: Lying)   Pulse 62   Temp 36.5 °C (97.7 °F) (Oral)   Resp 18   Ht 5' 3" (1.6 m)   Wt 68 kg (149 lb 14.6 oz)   SpO2 98%   Breastfeeding No   BMI 26.56 kg/m²     "

## 2023-09-18 NOTE — ANESTHESIA POSTPROCEDURE EVALUATION
Anesthesia Post Evaluation    Patient: Caitlin Delaney    Procedure(s) Performed: Procedure(s) (LRB):  CYSTOURETEROSCOPY, WITH RETROGRADE PYELOGRAM AND URETERAL STENT INSERTION (Right)    Final Anesthesia Type: general      Patient location during evaluation: PACU  Patient participation: Yes- Able to Participate  Level of consciousness: awake and alert and oriented  Post-procedure vital signs: reviewed and stable  Pain management: adequate  Airway patency: patent    PONV status at discharge: No PONV  Anesthetic complications: no      Cardiovascular status: blood pressure returned to baseline and stable  Respiratory status: unassisted and spontaneous ventilation  Hydration status: euvolemic  Follow-up not needed.          Vitals Value Taken Time   /83 09/18/23 1433   Temp 36.6 °C (97.9 °F) 09/18/23 1325   Pulse 66 09/18/23 1433   Resp 16 09/18/23 1433   SpO2 95 % 09/18/23 1433   Vitals shown include unvalidated device data.      No case tracking events are documented in the log.      Pain/Kimberley Score: Pain Rating Prior to Med Admin: 7 (9/18/2023  2:25 PM)  Pain Rating Post Med Admin: 0 (9/18/2023  5:34 AM)  Kimberley Score: 10 (9/18/2023  2:00 PM)

## 2023-09-18 NOTE — PLAN OF CARE
Recommendations   1) Advance PO diet to cardiac s/p procedure   2) weigh weekly    Goals: 1) PO intakes > 50% of meals and supplements at f/u  Nutrition Goal Status: new  Communication of RD Recs:  (POC, sticky note)

## 2023-09-18 NOTE — NURSING
Discharge instructions given to patient, Spouse and mom, all verbalized understanding. PIV removed with catheter intact. Telemetry removed and returned to monitor room. Patient urinated before leaving.

## 2023-09-18 NOTE — NURSING
Awake, alert and oriented x4. Good historian. Follows simple commands. Denies any complaints. 22g to lt thumb with site free of s/s of infiltration. Reviewed room assignment.. Questions answered and encouraged. VSS. To room air.

## 2023-09-18 NOTE — PLAN OF CARE
[facility] - Med/Surg  Initial Discharge Assessment       Primary Care Provider: Ivan Meek MD    Admission Diagnosis: Acute unilateral obstructive uropathy [N13.9]  Intractable pain [R52]  Kidney stone [N20.0]    Admission Date: 9/17/2023  Expected Discharge Date:     Transition of Care Barriers: None    Payor: [employer] / Plan: [employer] [facility] [employer] [place] / Product Type: Commercial /     Extended Emergency Contact Information  Primary Emergency Contact: Paige Delaney  [facility] [place] [address] [facility]  Home Phone: [phone]  Mobile Phone: [phone]  Relation: Spouse  Preferred language: English   needed? No    Discharge Plan A: Home with family  Discharge Plan B: Home with family      [id number], [place] - [address]  [address]  [place] [address]  Phone: [phone] Fax: [phone]    DC assessment completed at bedside with pt, information on facesheet verified. Lives at listed address with spouse and son. Spouse will drive her home. PCP is Dr. Meek. Pharmacy is [facility] in [place]. Denies coumadin, HH, HD, DME. Independent, drives self. Insurance verified. Denies POA. Denies recent admission. Planning to DC home when clear.     Initial Assessment (most recent)       Adult Discharge Assessment - 09/18/23 1252          Discharge Assessment    Assessment Type Discharge Planning Assessment     Confirmed/corrected address, phone number and insurance Yes     Confirmed Demographics Correct on Facesheet     Source of Information patient     People in Home spouse;child(andrew), dependent     Facility Arrived From: home     Do you expect to return to your current living situation? Yes     Do you have help at home or someone to help you manage your care at home? Yes     Who are your caregiver(s) and their phone number(s)? spouse     Prior to  hospitilization cognitive status: Alert/Oriented     Current cognitive status: Alert/Oriented     Equipment Currently Used at Home none     Readmission within 30 days? No     Patient currently being followed by outpatient case management? No     Do you currently have service(s) that help you manage your care at home? No     Do you take prescription medications? Yes     Do you have prescription coverage? Yes     Do you have any problems affording any of your prescribed medications? No     Is the patient taking medications as prescribed? yes     Who is going to help you get home at discharge? spouse     How do you get to doctors appointments? car, drives self     Are you on dialysis? No     Do you take coumadin? No     DME Needed Upon Discharge  none     Discharge Plan discussed with: Patient;Spouse/sig other     Name(s) and Number(s) Venito @ bedside     Transition of Care Barriers None     Discharge Plan A Home with family     Discharge Plan B Home with family

## 2023-09-18 NOTE — H&P
Formerly [facility], [facility]  History & Physical    Patient Name: Caitlin Delaney  MRN: [id number]  Patient Class: OP- Observation  Admission Date: 9/17/2023  Attending Physician: Kiki Pierce MD   Primary Care Provider: Ivan Meek MD         Patient information was obtained from patient, past medical records and ER records.     Subjective:     Principal Problem:Acute unilateral obstructive uropathy    Chief Complaint:   Chief Complaint   Patient presents with    Flank Pain     Rt. Side  / seen at [facility] Tuesday         HPI: Caitlin Delaney is a 43-year-old female who presents emergency room for evaluation of right flank pain.  Patient was seen at [place] emergency room on 09/12/2023 for same symptoms.  She underwent CT scan and lab work which demonstrated a right 5 mm obstructive uropathy.  She was placed on Flomax and Cipro at that time.  She is been compliant with her medications.  She reports again to the emergency room here tonight complaining of continuing and worsening right flank pain.  She describes the pain as a intense sensation.  She rates the pain 10/10 at worst.  No aggravating or alleviating factors.  Previous medical history includes bipolar disorder, GERD, anxiety, hyperlipidemia, and active smoker.  ER workup.  CBC unremarkable.  CMP with mild hypokalemia 3.4.  Urinalysis with rare yeast otherwise unremarkable.  CT the abdomen and pelvis demonstrated a persistent 5 x 6 mm calculus obstructing the distal RIGHT ureter and decreased perinephric stranding.  Patient was given IV fluids emergency room.  She was also given Rocephin.  Patient be admitted to Hospital Medicine for treatment and management.  Urology was consulted.      Past Medical History:   Diagnosis Date    Bipolar disorder, unspecified     GERD (gastroesophageal reflux disease)        Past Surgical History:   Procedure Laterality Date    1 x csection      complete hysterectomy      cyst removed       foot surgery x 2 times         Review of patient's allergies indicates:  No Known Allergies    No current facility-administered medications on file prior to encounter.     Current Outpatient Medications on File Prior to Encounter   Medication Sig    albuterol (PROVENTIL/VENTOLIN HFA) 90 mcg/actuation inhaler Inhale 2 puffs into the lungs every 6 (six) hours as needed for Wheezing or Shortness of Breath.    ARIPiprazole (ABILIFY) 10 MG Tab Take 10 mg by mouth once daily.    estradioL (ESTRACE) 0.5 MG tablet Take 0.5 mg by mouth once daily.    ranitidine HCl (ZANTAC ORAL) Take 1 tablet by mouth once daily.    ARIPiprazole (ABILIFY MAINTENA) 400 mg sers syringe Inject 400 mg into the muscle every 28 days.    fluticasone propionate (FLONASE) 50 mcg/actuation nasal spray 1 spray by Each Nostril route 2 (two) times a day.    propranoloL (INDERAL) 20 MG tablet Take 20 mg by mouth 2 (two) times daily.    [DISCONTINUED] busPIRone (BUSPAR) 10 MG tablet Take 10 mg by mouth 2 (two) times daily.    [DISCONTINUED] celecoxib (CELEBREX) 100 MG capsule 100 mg.    [DISCONTINUED] ciprofloxacin HCl (CIPRO) 500 MG tablet Take 1 tablet (500 mg total) by mouth 2 (two) times daily. for 10 days    [DISCONTINUED] HYDROcodone-acetaminophen (NORCO)  mg per tablet Take 1 tablet by mouth every 6 (six) hours as needed for Pain.    [DISCONTINUED] pantoprazole (PROTONIX) 40 MG tablet 40 mg.    [DISCONTINUED] tamsulosin (FLOMAX) 0.4 mg Cap Take 1 capsule (0.4 mg total) by mouth once daily.    [DISCONTINUED] traZODone (DESYREL) 100 MG tablet Take 100 mg by mouth every evening.    [DISCONTINUED] traZODone (DESYREL) 50 MG tablet Take 50 mg by mouth every evening.    [DISCONTINUED] triamcinolone acetonide 0.1% (KENALOG) 0.1 % cream Apply topically 2 (two) times daily. Do use it on the face     Family History    Family history is unknown by patient.       Tobacco Use    Smoking status: Every Day     Types: Cigars    Smokeless  tobacco: Never   Substance and Sexual Activity    Alcohol use: Yes     Comment: occ    Drug use: Never    Sexual activity: Yes     Review of Systems   Constitutional:  Positive for activity change and appetite change. Negative for chills, diaphoresis and fever.   HENT:  Negative for congestion, nosebleeds and tinnitus.    Eyes:  Negative for photophobia and visual disturbance.   Respiratory:  Negative for cough, chest tightness, shortness of breath and wheezing.    Cardiovascular:  Negative for chest pain, palpitations and leg swelling.   Gastrointestinal:  Positive for nausea and vomiting. Negative for abdominal distention, abdominal pain, constipation and diarrhea.   Endocrine: Negative for cold intolerance and heat intolerance.   Genitourinary:  Positive for flank pain. Negative for difficulty urinating, dysuria, frequency, hematuria and urgency.   Musculoskeletal:  Negative for arthralgias, back pain and myalgias.   Skin:  Negative for pallor, rash and wound.   Allergic/Immunologic: Negative for immunocompromised state.   Neurological:  Negative for dizziness, tremors, facial asymmetry, speech difficulty and weakness.   Hematological:  Negative for adenopathy. Does not bruise/bleed easily.   Psychiatric/Behavioral:  Negative for confusion and sleep disturbance. The patient is not nervous/anxious.      Objective:     Vital Signs (Most Recent):  Temp: 97.6 °F (36.4 °C) (09/17/23 1658)  Pulse: 64 (09/17/23 2018)  Resp: 18 (09/17/23 1910)  BP: (!) 141/72 (09/17/23 2018)  SpO2: 96 % (09/17/23 2018) Vital Signs (24h Range):  Temp:  [97.6 °F (36.4 °C)] 97.6 °F (36.4 °C)  Pulse:  [] 64  Resp:  [18] 18  SpO2:  [94 %-98 %] 96 %  BP: (117-141)/(68-91) 141/72     Weight: 68 kg (150 lb)  Body mass index is 26.57 kg/m².     Physical Exam  Vitals and nursing note reviewed.   Constitutional:       General: She is not in acute distress.     Appearance: She is well-developed. She is ill-appearing. She is not diaphoretic.    HENT:      Head: Normocephalic.      Mouth/Throat:      Mouth: Mucous membranes are dry.   Eyes:      General: No scleral icterus.     Conjunctiva/sclera: Conjunctivae normal.      Pupils: Pupils are equal, round, and reactive to light.   Neck:      Vascular: No JVD.   Cardiovascular:      Rate and Rhythm: Normal rate and regular rhythm.      Heart sounds: Normal heart sounds. No murmur heard.     No friction rub. No gallop.   Pulmonary:      Effort: Pulmonary effort is normal. No respiratory distress.      Breath sounds: Normal breath sounds. No wheezing or rales.   Abdominal:      General: Bowel sounds are normal. There is no distension.      Palpations: Abdomen is soft.      Tenderness: There is no abdominal tenderness. There is right CVA tenderness. There is no guarding or rebound.   Musculoskeletal:         General: No tenderness. Normal range of motion.      Cervical back: Normal range of motion and neck supple.   Lymphadenopathy:      Cervical: No cervical adenopathy.   Skin:     General: Skin is warm and dry.      Capillary Refill: Capillary refill takes less than 2 seconds.      Coloration: Skin is not pale.      Findings: No erythema or rash.   Neurological:      Mental Status: She is alert and oriented to person, place, and time.      Cranial Nerves: No cranial nerve deficit.      Sensory: No sensory deficit.      Coordination: Coordination normal.      Deep Tendon Reflexes: Reflexes normal.   Psychiatric:         Behavior: Behavior normal.         Thought Content: Thought content normal.         Judgment: Judgment normal.              CRANIAL NERVES     CN III, IV, VI   Pupils are equal, round, and reactive to light.       Significant Labs: All pertinent labs within the past 24 hours have been reviewed.  CBC:   Recent Labs   Lab 09/17/23  1750   WBC 8.36   HGB 11.7*   HCT 36.3*        CMP:   Recent Labs   Lab 09/17/23  1750      K 3.4*      CO2 25   GLU 92   BUN 12   CREATININE 0.7    CALCIUM 9.9   PROT 8.1   ALBUMIN 3.9   BILITOT 0.2   ALKPHOS 86   AST 18   ALT 25   ANIONGAP 14     Urine Studies:   Recent Labs   Lab 09/17/23  1720   COLORU Yellow   APPEARANCEUA Hazy*   PHUR 7.0   SPECGRAV 1.010   PROTEINUA Negative   GLUCUA Negative   KETONESU Negative   BILIRUBINUA Negative   OCCULTUA Negative   NITRITE Negative   UROBILINOGEN Negative   LEUKOCYTESUR Trace*   RBCUA 2   WBCUA 9*   BACTERIA Rare   SQUAMEPITHEL 13       Significant Imaging: I have reviewed all pertinent imaging results/findings within the past 24 hours.    CT  FINDINGS:  There is moderate hydro nephrosis and hydroureter on the RIGHT with a distal 5 x 6 mm 1100 Hounsfield units stone approximately 4 cm from the UVJ.  No other nonobstructing upper kidney stones are evident in either kidney.  Urinary bladder is unremarkable.     The liver, spleen, gallbladder, pancreas, adrenal glands and loops of large and small intestines appear unremarkable without contrast.  The uterus appears normal.     Bony structures are intact.  Aorta and vena cava normal in caliber.  No and abdominal wall or inguinal hernia.  The base of the heart pericardium, lung bases and bones appear intact.     Impression:     Persistent 5 x 6 mm calculus obstructing the distal RIGHT ureter.     Decreased perinephric stranding.      Assessment/Plan:     * Acute unilateral obstructive uropathy  Acute problem   NPO after midnight   IV fluids   Morphine p.r.n. pain   Zofran p.r.n. nausea   Rocephin 1 g q.12  Urology consulted          VTE Risk Mitigation (From admission, onward)         Ordered     IP VTE HIGH RISK PATIENT  Once         09/17/23 2002     Place sequential compression device  Until discontinued         09/17/23 2002     Place PRADIP hose  Until discontinued         09/17/23 2002                     Sandeep Mensah NP  Department of Hospital Medicine  [facility]

## 2023-09-18 NOTE — ANESTHESIA PROCEDURE NOTES
Intubation    Date/Time: 9/18/2023 12:46 PM    Performed by: Mani Ghosh CRNA  Authorized by: Mani Ghosh CRNA    Intubation:     Induction:  Intravenous    Intubated:  Postinduction    Mask Ventilation:  Easy mask    Attempts:  1    Attempted By:  CRNA    Difficult Airway Encountered?: No      Complications:  None    Airway Device:  Supraglottic airway/LMA    Airway Device Size:  3.0    Style/Cuff Inflation:  Cuffed (inflated to minimal occlusive pressure)    Placement Verified By:  Capnometry    Complicating Factors:  None    Findings Post-Intubation:  BS equal bilateral

## 2023-09-18 NOTE — ANESTHESIA PREPROCEDURE EVALUATION
09/18/2023  Caitlin Delaney is a 43 y.o., female.      Pre-op Assessment    I have reviewed the Patient Summary Reports.     I have reviewed the Nursing Notes. I have reviewed the NPO Status.   I have reviewed the Medications.     Review of Systems  Anesthesia Hx:  No problems with previous Anesthesia  Neg history of prior surgery. Denies Family Hx of Anesthesia complications.   Denies Personal Hx of Anesthesia complications.   Social:  Smoker, Alcohol Use    Hematology/Oncology:  Hematology Normal   Oncology Normal     EENT/Dental:EENT/Dental Normal   Cardiovascular:  Cardiovascular Normal     Pulmonary:  Pulmonary Normal    Renal/:   renal calculi    Hepatic/GI:   GERD, well controlled    Musculoskeletal:  Musculoskeletal Normal    Neurological:  Neurology Normal    Endocrine:  Endocrine Normal    Dermatological:  Skin Normal    Psych:   Psychiatric History (Bipolar) anxiety depression          Physical Exam  General: Well nourished and Alert    Airway:  Mallampati: II   Mouth Opening: Normal  TM Distance: Normal  Tongue: Normal  Neck ROM: Normal ROM    Dental:  Intact    Chest/Lungs:  Clear to auscultation, Normal Respiratory Rate    Heart:  Rate: Normal  Rhythm: Regular Rhythm  Sounds: Normal        Anesthesia Plan  Type of Anesthesia, risks & benefits discussed:    Anesthesia Type: Gen ETT, Gen Supraglottic Airway  Intra-op Monitoring Plan: Standard ASA Monitors  Post Op Pain Control Plan: multimodal analgesia and IV/PO Opioids PRN  Induction:  IV and Inhalation  Informed Consent: Informed consent signed with the Patient and all parties understand the risks and agree with anesthesia plan.  All questions answered. Patient consented to blood products? Yes  ASA Score: 2    Ready For Surgery From Anesthesia Perspective.     .

## 2023-09-18 NOTE — SUBJECTIVE & OBJECTIVE
Past Medical History:   Diagnosis Date    Bipolar disorder, unspecified     GERD (gastroesophageal reflux disease)        Past Surgical History:   Procedure Laterality Date    1 x csection      complete hysterectomy      cyst removed      foot surgery x 2 times         Review of patient's allergies indicates:  No Known Allergies    No current facility-administered medications on file prior to encounter.     Current Outpatient Medications on File Prior to Encounter   Medication Sig    albuterol (PROVENTIL/VENTOLIN HFA) 90 mcg/actuation inhaler Inhale 2 puffs into the lungs every 6 (six) hours as needed for Wheezing or Shortness of Breath.    ARIPiprazole (ABILIFY) 10 MG Tab Take 10 mg by mouth once daily.    estradioL (ESTRACE) 0.5 MG tablet Take 0.5 mg by mouth once daily.    ranitidine HCl (ZANTAC ORAL) Take 1 tablet by mouth once daily.    ARIPiprazole (ABILIFY MAINTENA) 400 mg sers syringe Inject 400 mg into the muscle every 28 days.    fluticasone propionate (FLONASE) 50 mcg/actuation nasal spray 1 spray by Each Nostril route 2 (two) times a day.    propranoloL (INDERAL) 20 MG tablet Take 20 mg by mouth 2 (two) times daily.    [DISCONTINUED] busPIRone (BUSPAR) 10 MG tablet Take 10 mg by mouth 2 (two) times daily.    [DISCONTINUED] celecoxib (CELEBREX) 100 MG capsule 100 mg.    [DISCONTINUED] ciprofloxacin HCl (CIPRO) 500 MG tablet Take 1 tablet (500 mg total) by mouth 2 (two) times daily. for 10 days    [DISCONTINUED] HYDROcodone-acetaminophen (NORCO)  mg per tablet Take 1 tablet by mouth every 6 (six) hours as needed for Pain.    [DISCONTINUED] pantoprazole (PROTONIX) 40 MG tablet 40 mg.    [DISCONTINUED] tamsulosin (FLOMAX) 0.4 mg Cap Take 1 capsule (0.4 mg total) by mouth once daily.    [DISCONTINUED] traZODone (DESYREL) 100 MG tablet Take 100 mg by mouth every evening.    [DISCONTINUED] traZODone (DESYREL) 50 MG tablet Take 50 mg by mouth every evening.    [DISCONTINUED] triamcinolone acetonide 0.1%  (KENALOG) 0.1 % cream Apply topically 2 (two) times daily. Do use it on the face     Family History    Family history is unknown by patient.       Tobacco Use    Smoking status: Every Day     Types: Cigars    Smokeless tobacco: Never   Substance and Sexual Activity    Alcohol use: Yes     Comment: occ    Drug use: Never    Sexual activity: Yes     Review of Systems   Constitutional:  Positive for activity change and appetite change. Negative for chills, diaphoresis and fever.   HENT:  Negative for congestion, nosebleeds and tinnitus.    Eyes:  Negative for photophobia and visual disturbance.   Respiratory:  Negative for cough, chest tightness, shortness of breath and wheezing.    Cardiovascular:  Negative for chest pain, palpitations and leg swelling.   Gastrointestinal:  Positive for nausea and vomiting. Negative for abdominal distention, abdominal pain, constipation and diarrhea.   Endocrine: Negative for cold intolerance and heat intolerance.   Genitourinary:  Positive for flank pain. Negative for difficulty urinating, dysuria, frequency, hematuria and urgency.   Musculoskeletal:  Negative for arthralgias, back pain and myalgias.   Skin:  Negative for pallor, rash and wound.   Allergic/Immunologic: Negative for immunocompromised state.   Neurological:  Negative for dizziness, tremors, facial asymmetry, speech difficulty and weakness.   Hematological:  Negative for adenopathy. Does not bruise/bleed easily.   Psychiatric/Behavioral:  Negative for confusion and sleep disturbance. The patient is not nervous/anxious.      Objective:     Vital Signs (Most Recent):  Temp: 97.6 °F (36.4 °C) (09/17/23 1658)  Pulse: 64 (09/17/23 2018)  Resp: 18 (09/17/23 1910)  BP: (!) 141/72 (09/17/23 2018)  SpO2: 96 % (09/17/23 2018) Vital Signs (24h Range):  Temp:  [97.6 °F (36.4 °C)] 97.6 °F (36.4 °C)  Pulse:  [] 64  Resp:  [18] 18  SpO2:  [94 %-98 %] 96 %  BP: (117-141)/(68-91) 141/72     Weight: 68 kg (150 lb)  Body mass index  is 26.57 kg/m².     Physical Exam  Vitals and nursing note reviewed.   Constitutional:       General: She is not in acute distress.     Appearance: She is well-developed. She is ill-appearing. She is not diaphoretic.   HENT:      Head: Normocephalic.      Mouth/Throat:      Mouth: Mucous membranes are dry.   Eyes:      General: No scleral icterus.     Conjunctiva/sclera: Conjunctivae normal.      Pupils: Pupils are equal, round, and reactive to light.   Neck:      Vascular: No JVD.   Cardiovascular:      Rate and Rhythm: Normal rate and regular rhythm.      Heart sounds: Normal heart sounds. No murmur heard.     No friction rub. No gallop.   Pulmonary:      Effort: Pulmonary effort is normal. No respiratory distress.      Breath sounds: Normal breath sounds. No wheezing or rales.   Abdominal:      General: Bowel sounds are normal. There is no distension.      Palpations: Abdomen is soft.      Tenderness: There is no abdominal tenderness. There is right CVA tenderness. There is no guarding or rebound.   Musculoskeletal:         General: No tenderness. Normal range of motion.      Cervical back: Normal range of motion and neck supple.   Lymphadenopathy:      Cervical: No cervical adenopathy.   Skin:     General: Skin is warm and dry.      Capillary Refill: Capillary refill takes less than 2 seconds.      Coloration: Skin is not pale.      Findings: No erythema or rash.   Neurological:      Mental Status: She is alert and oriented to person, place, and time.      Cranial Nerves: No cranial nerve deficit.      Sensory: No sensory deficit.      Coordination: Coordination normal.      Deep Tendon Reflexes: Reflexes normal.   Psychiatric:         Behavior: Behavior normal.         Thought Content: Thought content normal.         Judgment: Judgment normal.              CRANIAL NERVES     CN III, IV, VI   Pupils are equal, round, and reactive to light.       Significant Labs: All pertinent labs within the past 24 hours have  been reviewed.  CBC:   Recent Labs   Lab 09/17/23  1750   WBC 8.36   HGB 11.7*   HCT 36.3*        CMP:   Recent Labs   Lab 09/17/23  1750      K 3.4*      CO2 25   GLU 92   BUN 12   CREATININE 0.7   CALCIUM 9.9   PROT 8.1   ALBUMIN 3.9   BILITOT 0.2   ALKPHOS 86   AST 18   ALT 25   ANIONGAP 14     Urine Studies:   Recent Labs   Lab 09/17/23  1720   COLORU Yellow   APPEARANCEUA Hazy*   PHUR 7.0   SPECGRAV 1.010   PROTEINUA Negative   GLUCUA Negative   KETONESU Negative   BILIRUBINUA Negative   OCCULTUA Negative   NITRITE Negative   UROBILINOGEN Negative   LEUKOCYTESUR Trace*   RBCUA 2   WBCUA 9*   BACTERIA Rare   SQUAMEPITHEL 13       Significant Imaging: I have reviewed all pertinent imaging results/findings within the past 24 hours.    CT  FINDINGS:  There is moderate hydro nephrosis and hydroureter on the RIGHT with a distal 5 x 6 mm 1100 Hounsfield units stone approximately 4 cm from the UVJ.  No other nonobstructing upper kidney stones are evident in either kidney.  Urinary bladder is unremarkable.     The liver, spleen, gallbladder, pancreas, adrenal glands and loops of large and small intestines appear unremarkable without contrast.  The uterus appears normal.     Bony structures are intact.  Aorta and vena cava normal in caliber.  No and abdominal wall or inguinal hernia.  The base of the heart pericardium, lung bases and bones appear intact.     Impression:     Persistent 5 x 6 mm calculus obstructing the distal RIGHT ureter.     Decreased perinephric stranding.

## 2023-09-18 NOTE — HPI
Caitlin Delaney is a 43-year-old female who presents emergency room for evaluation of right flank pain.  Patient was seen at [place] emergency room on 09/12/2023 for same symptoms.  She underwent CT scan and lab work which demonstrated a right 5 mm obstructive uropathy.  She was placed on Flomax and Cipro at that time.  She is been compliant with her medications.  She reports again to the emergency room here tonight complaining of continuing and worsening right flank pain.  She describes the pain as a intense sensation.  She rates the pain 10/10 at worst.  No aggravating or alleviating factors.  Previous medical history includes bipolar disorder, GERD, anxiety, hyperlipidemia, and active smoker.  ER workup.  CBC unremarkable.  CMP with mild hypokalemia 3.4.  Urinalysis with rare yeast otherwise unremarkable.  CT the abdomen and pelvis demonstrated a persistent 5 x 6 mm calculus obstructing the distal RIGHT ureter and decreased perinephric stranding.  Patient was given IV fluids emergency room.  She was also given Rocephin.  Patient be admitted to Hospital Medicine for treatment and management.  Urology was consulted.

## 2023-09-18 NOTE — OP NOTE
[facility]  Operative Note    Date: 09/18/2023    Pre-Op Diagnosis: Right distal ureteral stone    Post-Op Diagnosis: Same    Procedure(s) Performed:   Cystourethroscopy  Right ureteroscopy with laser lithotripsy and basket extraction of stone fragments  Right retrograde pyelogram  Right ureteral stent placement, 6 x 24 JJ stent    Flouro <1 hour    Specimen(s): Right ureteral stone    Staff Surgeon: Miki Ivan Jr, MD    Anesthesia: General    Indications: Caitlin Delaney is a 43 y.o. female with an obstructing 7 mm distal ureteral stone complicated by severe hydronephrosis and pain.     Findings:   Right distal ureteral stone lasered and extracted. Severe right hydro on retrograde. Stent placed with strings.     Estimated Blood Loss: Minimal    Drains: 6 x 24 cm JJ stent    Complications: None    Implants: * No implants in log *    Procedure in detail:  After informed consent was obtained, the patient was brought the the cystoscopy suite and placed in the supine position.  SCDs were applied and working.  GETA was administered.  The patient was then placed in the dorsal lithotomy position and prepped and draped in the usual sterile fashion.      A rigid cystoscope in a 22 Fr sheath was introduced into the patient's urethra.  This passed easily.  The entire urethra was visualized which showed no strictures or masses.  Formal cystoscopy was performed which revealed no masses or lesions suspicious for malignancy, no bladder stones, no bladder diverticula, no trabeculations.  The UOs were visualized in the normal anatomic position bilaterally and clear efflux was visualized.      A motion wire was passed up the rightUO and up into the kidney.  This passed easily and placement was confirmed using fluoro.  The cystoscope was removed keeping the guidewire in place.      An 8 Fr rigid ureteroscope was passed into the patient's bladder alongside the wire under direct vision.  It was then passed  through the right UO alongside the wire.  A stone was encountered in the distal ureter. A 275 micron laser fiber was passed through the ureteroscope.  The stone was fragmented using the laser.  The laser fiber was removed and an NCircle basket was introduced through the ureteroscope.  Stone fragments were removed.    The ureteroscope was advanced into the proximal ureter.  A retrograde was performed. The entire ureter was examined for residual stones and removed if encountered. The ureteroscope was removed keeping the guide wire in place.      A cystoscope was reinserted and the bladder was irrigated to remove the stone fragments.  The bladder was drained the cystoscope removed keeping the wire in place.  The wire was backloaded through the cystoscope using a pusher.    A 6x24 JJ ureteral stent with strings was passed over the wire and up into the renal pelvis using fluoro.  When the coil appeared to be in good position in the kidney and the radio-opaque marker of the pusher was at the inferior pubis, the wire was removed under continuous fluoro.  Good coils were seen in the kidney and the bladder using fluoro.      The patient tolerated the procedure well and was transferred to the recovery room in stable condition.      Disposition: Back to floor with hospital medicine. Ok to discharge with PO Abx and pain meds. Patient will remove stent on 9/25/23 at home. Renal ultrasound in 6 weeks.       Miki Ivan Jr, MD

## 2023-09-18 NOTE — PLAN OF CARE
Problem: Adult Inpatient Plan of Care  Goal: Plan of Care Review  Outcome: Ongoing, Progressing  Goal: Patient-Specific Goal (Individualized)  Outcome: Ongoing, Progressing  Goal: Absence of Hospital-Acquired Illness or Injury  Outcome: Ongoing, Progressing  Goal: Optimal Comfort and Wellbeing  Outcome: Ongoing, Progressing  Goal: Readiness for Transition of Care  Outcome: Ongoing, Progressing     Problem: Infection  Goal: Absence of Infection Signs and Symptoms  Outcome: Ongoing, Progressing   Plan of care reviewed with patient,verbalized understanding.  IV fluids/ IV antibiotics administered as per orders. SR on telemetry.    NPO since MN for procedure in am .Remains free from falls, injury. Instructed to call for assistance as needed ,verbalized understanding. Bed in low & locked position. Call light in reach, bed alarm on .

## 2023-09-18 NOTE — ASSESSMENT & PLAN NOTE
Acute problem   NPO after midnight   IV fluids   Morphine p.r.n. pain   Zofran p.r.n. nausea   Rocephin 1 g q.12  Urology consulted

## 2023-09-19 NOTE — DISCHARGE SUMMARY
[facility]  Discharge Summary      Patient Name: Caitlin Delaney  MRN: [id number]  JUDITH: [id number]  Patient Class: OP- Observation  Admission Date: 9/17/2023  Hospital Length of Stay: 1 days  Discharge Date and Time: 9/18/2023  3:16 PM  Attending Physician: No att. providers found   Discharging Provider: Esvin Hernandez PA-C  Primary Care Provider: Ivan Meek MD    Primary Care Team: Networked reference to record PCT     HPI:   Caitlin Delaney is a 43-year-old female who presents emergency room for evaluation of right flank pain.  Patient was seen at [place] emergency room on 09/12/2023 for same symptoms.  She underwent CT scan and lab work which demonstrated a right 5 mm obstructive uropathy.  She was placed on Flomax and Cipro at that time.  She is been compliant with her medications.  She reports again to the emergency room here tonight complaining of continuing and worsening right flank pain.  She describes the pain as a intense sensation.  She rates the pain 10/10 at worst.  No aggravating or alleviating factors.  Previous medical history includes bipolar disorder, GERD, anxiety, hyperlipidemia, and active smoker.  ER workup.  CBC unremarkable.  CMP with mild hypokalemia 3.4.  Urinalysis with rare yeast otherwise unremarkable.  CT the abdomen and pelvis demonstrated a persistent 5 x 6 mm calculus obstructing the distal RIGHT ureter and decreased perinephric stranding.  Patient was given IV fluids emergency room.  She was also given Rocephin.  Patient be admitted to Hospital Medicine for treatment and management.  Urology was consulted.      Procedure(s) (LRB):  CYSTOURETEROSCOPY, WITH RETROGRADE PYELOGRAM AND URETERAL STENT INSERTION (Right)      Hospital Course:   Patient was monitored closely throughout course of hospital stay. Started on prn pain medication and antiemetics. Placed on IV rocephin for pyelonephritis. Urology consulted on admission. She  underwent cystoscopy with stone extraction with Dr. Ivan on 9/19. Urology cleared patient for discharge. Patient discharged with course of oral antibiotics and pain medication. Patient verbalized understanding of discharge instructions and return precautions.        Goals of Care Treatment Preferences:  Code Status: Full Code      Consults:   Consults (From admission, onward)        Status Ordering Provider     Inpatient consult to Urology  Once        Provider:  Cameron Mccullough MD    Completed IMELDA COYLE     IP consult to dietary  Once        Provider:  (Not yet assigned)    Completed IMELDA COYLE     Inpatient consult to Urology  Once        Provider:  Miki Ivan Jr., MD    Completed CAMERON MCCULLOUGH          No new Assessment & Plan notes have been filed under this hospital service since the last note was generated.  Service: Hospital Medicine    Final Active Diagnoses:    Diagnosis Date Noted POA    PRINCIPAL PROBLEM:  Acute unilateral obstructive uropathy [N13.9] 09/17/2023 Yes      Problems Resolved During this Admission:       Discharged Condition: fair    Disposition: Home or Self Care    Follow Up:   Follow-up Information     Miki Ivan Jr., MD Follow up in 2 week(s).    Specialty: Urology  Why: hospital follow up  Contact information:  [address] [place] [address] DR FARHAT Mai  [facility] [address]  [phone]             Ivan Meek MD. Go on 9/25/2023.    Specialty: Family Medicine  Why: hospital follow up at 2:00  Contact information:  [address] [place] [address] [phone]                       Patient Instructions:      Urinalysis   Standing Status: Future Standing Exp. Date: 03/17/24   Order Comments: Urinalysis reflex     Order Specific Question Answer Comments   Collection Type Urine, Clean Catch      Ambulatory referral/consult to Smoking Cessation Program   Standing Status: Future   Referral Priority: Routine Referral Type: Consultation    Referral Reason: Specialty Services Required   Requested Specialty: CTTS   Number of Visits Requested: 1     Diet Adult Regular     Notify your health care provider if you experience any of the following:  temperature >100.4     Notify your health care provider if you experience any of the following:  persistent nausea and vomiting or diarrhea     Notify your health care provider if you experience any of the following:  severe uncontrolled pain     Notify your health care provider if you experience any of the following:  persistent dizziness, light-headedness, or visual disturbances     Reason for not Prescribing Nicotine Replacement     Order Specific Question Answer Comments   Reason for not Prescribing: Not medically appropriate at this time      Activity as tolerated       Significant Diagnostic Studies: Labs:   CMP   Recent Labs   Lab 09/17/23 1750 09/18/23  0456    142   K 3.4* 3.5    105   CO2 25 26   GLU 92 75   BUN 12 15   CREATININE 0.7 0.7   CALCIUM 9.9 8.5*   PROT 8.1 6.4   ALBUMIN 3.9 3.1*   BILITOT 0.2 0.1   ALKPHOS 86 68   AST 18 19   ALT 25 22   ANIONGAP 14 11    and CBC   Recent Labs   Lab 09/17/23 1750 09/18/23  0456   WBC 8.36 8.29   HGB 11.7* 10.4*   HCT 36.3* 33.0*    297       Pending Diagnostic Studies:     None         Medications:  Reconciled Home Medications:      Medication List      START taking these medications    cefdinir 300 MG capsule  Commonly known as: OMNICEF  Take 1 capsule (300 mg total) by mouth every 12 (twelve) hours. for 5 days     ketorolac 10 mg tablet  Commonly known as: TORADOL  Take 1 tablet (10 mg total) by mouth every 8 (eight) hours as needed for Pain.     traMADoL 50 mg tablet  Commonly known as: ULTRAM  Take 1 tablet (50 mg total) by mouth every 6 (six) hours as needed for Pain.        CHANGE how you take these medications    tamsulosin 0.4 mg Cap  Commonly known as: FLOMAX  Take 1 capsule (0.4 mg total) by mouth every evening. Take nightly  for stent/stone  What changed:   · when to take this  · additional instructions        CONTINUE taking these medications    * ARIPiprazole 10 MG Tab  Commonly known as: ABILIFY  Take 10 mg by mouth once daily.     * ABILIFY MAINTENA 400 mg Sers injection (pre-filled dual chamber)  Generic drug: ARIPiprazole  Inject 400 mg into the muscle every 28 days.     albuterol 90 mcg/actuation inhaler  Commonly known as: PROVENTIL/VENTOLIN HFA  Inhale 2 puffs into the lungs every 6 (six) hours as needed for Wheezing or Shortness of Breath.     estradioL 0.5 MG tablet  Commonly known as: ESTRACE  Take 0.5 mg by mouth once daily.     fluticasone propionate 50 mcg/actuation nasal spray  Commonly known as: FLONASE  1 spray by Each Nostril route 2 (two) times a day.     propranoloL 20 MG tablet  Commonly known as: INDERAL  Take 20 mg by mouth 2 (two) times daily.     traZODone 100 MG tablet  Commonly known as: DESYREL  Take 100 mg by mouth every evening.     ZANTAC ORAL  Take 1 tablet by mouth once daily.         * This list has 2 medication(s) that are the same as other medications prescribed for you. Read the directions carefully, and ask your doctor or other care provider to review them with you.            STOP taking these medications    ciprofloxacin HCl 500 MG tablet  Commonly known as: CIPRO     HYDROcodone-acetaminophen  mg per tablet  Commonly known as: NORCO            Indwelling Lines/Drains at time of discharge:   Lines/Drains/Airways     Drain  Duration                Ureteral Drain/Stent 09/18/23 Right ureter 1 day                Time spent on the discharge of patient: 33 minutes         Esvin Hernandez PA-C  Department of Hospital Medicine  [facility]/Surg

## 2023-09-19 NOTE — HOSPITAL COURSE
Patient was monitored closely throughout course of hospital stay. Started on prn pain medication and antiemetics. Placed on IV rocephin for pyelonephritis. Urology consulted on admission. She underwent cystoscopy with stone extraction with Dr. Ivan on 9/19. Urology cleared patient for discharge. Patient discharged with course of oral antibiotics and pain medication. Patient verbalized understanding of discharge instructions and return precautions.

## 2023-09-22 ENCOUNTER — TELEPHONE (OUTPATIENT)
Dept: UROLOGY | Facility: CLINIC | Age: 43
End: 2023-09-22

## 2023-09-22 LAB
BACTERIA BLD CULT: NORMAL
BACTERIA BLD CULT: NORMAL

## 2023-09-22 NOTE — TELEPHONE ENCOUNTER
Tried calling patient to inform he she does need an appointment per . No answer. Left message on vm

## 2023-09-22 NOTE — TELEPHONE ENCOUNTER
----- Message from Stoney Martin sent at 9/22/2023  3:54 PM CDT -----  Contact: self  Type: Sooner Appointment Request        Caller is requesting a sooner appointment. Caller declined first available appointment listed below. Caller will not accept being placed on the waitlist and is requesting a message be sent to doctor.        Name of Caller: Patient   Best Call Back Number: [phone]  Additional Information: Pt has a stent in and can she take her a pt has question on what she can do with her stent. Pt also wants to get scheduled for her post op appt. Thanks

## 2023-09-22 NOTE — TELEPHONE ENCOUNTER
----- Message from Rachel Fuentes sent at 9/22/2023 10:40 AM CDT -----  Type:  Sooner Appointment Request    Caller is requesting a sooner appointment.  Caller declined first available appointment listed below.  Caller will not accept being placed on the waitlist and is requesting a message be sent to doctor.    Name of Caller:  Pt    When is the first available appointment?  11/13    Symptoms:  2 wk hospital visit    Would the patient rather a call back or a response via [facility]?  Call back    Best Call Back Number:  [phone]    Additional Information:   Pt needs to schedule 2 week f/u and also has some questions.   Please call back to advise. Thanks!

## 2023-09-25 LAB
COMPN STONE: NORMAL
LABORATORY COMMENT REPORT: NORMAL
SPECIMEN SOURCE: NORMAL
STONE ANALYSIS IR-IMP: NORMAL

## 2024-06-26 ENCOUNTER — HOSPITAL ENCOUNTER (EMERGENCY)
Facility: HOSPITAL | Age: 44
Discharge: HOME | End: 2024-06-26
Attending: STUDENT IN AN ORGANIZED HEALTH CARE EDUCATION/TRAINING PROGRAM
Payer: COMMERCIAL

## 2024-06-26 ENCOUNTER — TELEPHONE (OUTPATIENT)
Dept: OTOLARYNGOLOGY | Facility: CLINIC | Age: 44
End: 2024-06-26
Payer: COMMERCIAL

## 2024-06-26 VITALS
DIASTOLIC BLOOD PRESSURE: 66 MMHG | WEIGHT: 151 LBS | RESPIRATION RATE: 20 BRPM | OXYGEN SATURATION: 98 % | TEMPERATURE: 98 F | SYSTOLIC BLOOD PRESSURE: 118 MMHG | HEIGHT: 63 IN | BODY MASS INDEX: 26.75 KG/M2 | HEART RATE: 76 BPM

## 2024-06-26 DIAGNOSIS — K11.20 PAROTITIS: Primary | ICD-10-CM

## 2024-06-26 LAB
ANION GAP SERPL CALC-SCNC: 10 MMOL/L (ref 8–16)
BASOPHILS # BLD AUTO: 0.06 K/UL (ref 0–0.2)
BASOPHILS NFR BLD: 0.4 % (ref 0–1.9)
BUN SERPL-MCNC: 16 MG/DL (ref 6–20)
CALCIUM SERPL-MCNC: 9 MG/DL (ref 8.7–10.5)
CHLORIDE SERPL-SCNC: 107 MMOL/L (ref 95–110)
CO2 SERPL-SCNC: 26 MMOL/L (ref 23–29)
CREAT SERPL-MCNC: 0.7 MG/DL (ref 0.5–1.4)
DIFFERENTIAL METHOD BLD: ABNORMAL
EOSINOPHIL # BLD AUTO: 0.5 K/UL (ref 0–0.5)
EOSINOPHIL NFR BLD: 3.5 % (ref 0–8)
ERYTHROCYTE [DISTWIDTH] IN BLOOD BY AUTOMATED COUNT: 13.9 % (ref 11.5–14.5)
EST. GFR  (NO RACE VARIABLE): >60 ML/MIN/1.73 M^2
GLUCOSE SERPL-MCNC: 90 MG/DL (ref 70–110)
HCT VFR BLD AUTO: 36.3 % (ref 37–48.5)
HCV AB SERPL QL IA: NORMAL
HGB BLD-MCNC: 12 G/DL (ref 12–16)
HIV 1+2 AB+HIV1 P24 AG SERPL QL IA: NORMAL
IMM GRANULOCYTES # BLD AUTO: 0.05 K/UL (ref 0–0.04)
IMM GRANULOCYTES NFR BLD AUTO: 0.3 % (ref 0–0.5)
LYMPHOCYTES # BLD AUTO: 4.1 K/UL (ref 1–4.8)
LYMPHOCYTES NFR BLD: 28.5 % (ref 18–48)
MCH RBC QN AUTO: 30.1 PG (ref 27–31)
MCHC RBC AUTO-ENTMCNC: 33.1 G/DL (ref 32–36)
MCV RBC AUTO: 91 FL (ref 82–98)
MONOCYTES # BLD AUTO: 1 K/UL (ref 0.3–1)
MONOCYTES NFR BLD: 6.6 % (ref 4–15)
NEUTROPHILS # BLD AUTO: 8.7 K/UL (ref 1.8–7.7)
NEUTROPHILS NFR BLD: 60.7 % (ref 38–73)
NRBC BLD-RTO: 0 /100 WBC
PLATELET # BLD AUTO: 237 K/UL (ref 150–450)
PMV BLD AUTO: 9.7 FL (ref 9.2–12.9)
POTASSIUM SERPL-SCNC: 3.7 MMOL/L (ref 3.5–5.1)
RBC # BLD AUTO: 3.99 M/UL (ref 4–5.4)
SODIUM SERPL-SCNC: 143 MMOL/L (ref 136–145)
WBC # BLD AUTO: 14.37 K/UL (ref 3.9–12.7)

## 2024-06-26 PROCEDURE — 96375 TX/PRO/DX INJ NEW DRUG ADDON: CPT | Mod: 59

## 2024-06-26 PROCEDURE — 87389 HIV-1 AG W/HIV-1&-2 AB AG IA: CPT | Performed by: STUDENT IN AN ORGANIZED HEALTH CARE EDUCATION/TRAINING PROGRAM

## 2024-06-26 PROCEDURE — 80048 BASIC METABOLIC PNL TOTAL CA: CPT | Performed by: STUDENT IN AN ORGANIZED HEALTH CARE EDUCATION/TRAINING PROGRAM

## 2024-06-26 PROCEDURE — 96374 THER/PROPH/DIAG INJ IV PUSH: CPT

## 2024-06-26 PROCEDURE — 86803 HEPATITIS C AB TEST: CPT | Performed by: STUDENT IN AN ORGANIZED HEALTH CARE EDUCATION/TRAINING PROGRAM

## 2024-06-26 PROCEDURE — 63600175 PHARM REV CODE 636 W HCPCS: Performed by: STUDENT IN AN ORGANIZED HEALTH CARE EDUCATION/TRAINING PROGRAM

## 2024-06-26 PROCEDURE — 25000003 PHARM REV CODE 250: Performed by: STUDENT IN AN ORGANIZED HEALTH CARE EDUCATION/TRAINING PROGRAM

## 2024-06-26 PROCEDURE — 85025 COMPLETE CBC W/AUTO DIFF WBC: CPT | Performed by: STUDENT IN AN ORGANIZED HEALTH CARE EDUCATION/TRAINING PROGRAM

## 2024-06-26 PROCEDURE — 99285 EMERGENCY DEPT VISIT HI MDM: CPT | Mod: 25

## 2024-06-26 PROCEDURE — 25500020 PHARM REV CODE 255: Performed by: STUDENT IN AN ORGANIZED HEALTH CARE EDUCATION/TRAINING PROGRAM

## 2024-06-26 RX ORDER — DEXAMETHASONE SODIUM PHOSPHATE 100 MG/10ML
16 INJECTION INTRAMUSCULAR; INTRAVENOUS
Status: COMPLETED | OUTPATIENT
Start: 2024-06-26 | End: 2024-06-26

## 2024-06-26 RX ORDER — METHYLPREDNISOLONE 4 MG/1
TABLET ORAL
Qty: 1 EACH | Refills: 0 | Status: SHIPPED | OUTPATIENT
Start: 2024-06-26 | End: 2024-07-17

## 2024-06-26 RX ORDER — AMOXICILLIN AND CLAVULANATE POTASSIUM 875; 125 MG/1; MG/1
1 TABLET, FILM COATED ORAL 2 TIMES DAILY
Qty: 14 TABLET | Refills: 0 | Status: SHIPPED | OUTPATIENT
Start: 2024-06-26

## 2024-06-26 RX ORDER — GUAIFENESIN 600 MG/1
1200 TABLET, EXTENDED RELEASE ORAL 2 TIMES DAILY
Qty: 40 TABLET | Refills: 0 | Status: SHIPPED | OUTPATIENT
Start: 2024-06-26 | End: 2024-07-06

## 2024-06-26 RX ORDER — AMOXICILLIN AND CLAVULANATE POTASSIUM 875; 125 MG/1; MG/1
1 TABLET, FILM COATED ORAL
Status: COMPLETED | OUTPATIENT
Start: 2024-06-26 | End: 2024-06-26

## 2024-06-26 RX ORDER — KETOROLAC TROMETHAMINE 30 MG/ML
15 INJECTION, SOLUTION INTRAMUSCULAR; INTRAVENOUS
Status: COMPLETED | OUTPATIENT
Start: 2024-06-26 | End: 2024-06-26

## 2024-06-26 RX ADMIN — DEXAMETHASONE SODIUM PHOSPHATE 16 MG: 10 INJECTION INTRAMUSCULAR; INTRAVENOUS at 04:06

## 2024-06-26 RX ADMIN — KETOROLAC TROMETHAMINE 15 MG: 30 INJECTION, SOLUTION INTRAMUSCULAR; INTRAVENOUS at 02:06

## 2024-06-26 RX ADMIN — IOHEXOL 75 ML: 350 INJECTION, SOLUTION INTRAVENOUS at 01:06

## 2024-06-26 RX ADMIN — AMOXICILLIN AND CLAVULANATE POTASSIUM 1 TABLET: 875; 125 TABLET, FILM COATED ORAL at 04:06

## 2024-06-26 NOTE — TELEPHONE ENCOUNTER
----- Message from Lisa Cassidy MA sent at 6/26/2024  2:29 PM CDT -----  Regarding: FW: Hosp f/u  Unable to schedule a new patient appt. Can you assist please?  Thank you  ----- Message -----  From: Nell Tavarez  Sent: 6/26/2024   2:10 PM CDT  To: Jorge Witt Staff  Subject: Hosp f/u                                         Type:  Sooner Apoointment Request    Caller is requesting a sooner appointment.  Caller declined first available appointment listed below.  Caller will not accept being placed on the waitlist and is requesting a message be sent to doctor.    Name of Caller:Pt    When is the first available appointment?none    Symptoms:salivary gland clogged    Would the patient rather a call back or a response via [facility]? Call back    Best Call Back Number:[phone]    Additional Information: Pt was discharge from the ER this morning and needs a f/u appt. Thank you

## 2024-06-26 NOTE — ED PROVIDER NOTES
Encounter Date: 6/25/2024       History     Chief Complaint   Patient presents with    Neck Pain     Pain and swelling noted to left side of neck. Pt states strep x1 month ago. Pain started to left neck region on Monday night, then swelling started 4 hrs pta.     43-year-old female presenting with pain and swelling to the left side of her face.  She has had cold-like symptoms for the past several days but noticed swelling just anterior to her left ear yesterday night.  She states since then it is progressively worsened and now in front and behind her ear without any ear pain.  She denies any fevers.  Difficulty breathing, difficulty swallowing, or other facial swelling.  Chest pain or shortness breath.    The history is provided by the patient. No  was used.     Review of patient's allergies indicates:  No Known Allergies  Past Medical History:   Diagnosis Date    Bipolar disorder, unspecified     GERD (gastroesophageal reflux disease)      Past Surgical History:   Procedure Laterality Date    1 x csection      complete hysterectomy      cyst removed      CYSTOURETEROSCOPY WITH RETROGRADE PYELOGRAPHY AND INSERTION OF STENT INTO URETER Right 9/18/2023    Procedure: CYSTOURETEROSCOPY, WITH RETROGRADE PYELOGRAM AND URETERAL STENT INSERTION;  Surgeon: Miki Ivan Jr., MD;  Location: [facility];  Service: Urology;  Laterality: Right;    foot surgery x 2 times       Family History   Family history unknown: Yes     Social History     Tobacco Use    Smoking status: Every Day     Types: Cigars    Smokeless tobacco: Never   Substance Use Topics    Alcohol use: Yes     Comment: occ    Drug use: Never     Review of Systems   Constitutional:  Negative for fever.   HENT:  Positive for congestion, facial swelling, rhinorrhea and sinus pressure. Negative for dental problem, drooling, sore throat, trouble swallowing and voice change.    Respiratory:  Negative for shortness of breath.    Cardiovascular:  Negative  for chest pain.   Gastrointestinal:  Negative for nausea.   Genitourinary:  Negative for dysuria.   Musculoskeletal:  Negative for back pain.   Skin:  Negative for rash.   Neurological:  Negative for weakness.   Hematological:  Does not bruise/bleed easily.       Physical Exam     Initial Vitals [06/26/24 0050]   BP Pulse Resp Temp SpO2   118/66 76 20 97.9 °F (36.6 °C) 98 %      MAP       --         Physical Exam    Constitutional: She appears well-developed and well-nourished. She is not diaphoretic. No distress.   HENT:   Head: Normocephalic and atraumatic.   Right Ear: External ear normal.   Left Ear: External ear normal.   Swelling that appears to involve the left parotid gland.   Eyes: EOM are normal. Pupils are equal, round, and reactive to light. No scleral icterus.   Neck: Neck supple.   Normal range of motion.  Cardiovascular:  Normal rate, regular rhythm and normal heart sounds.           Pulmonary/Chest: Breath sounds normal. No stridor. No respiratory distress. She has no wheezes. She has no rales.   Abdominal: Abdomen is soft. She exhibits no distension. There is no abdominal tenderness.   Musculoskeletal:         General: No tenderness or edema. Normal range of motion.      Cervical back: Normal range of motion and neck supple.     Neurological: She is alert and oriented to person, place, and time. She has normal strength. No cranial nerve deficit. GCS score is 15. GCS eye subscore is 4. GCS verbal subscore is 5. GCS motor subscore is 6.   Skin: Skin is warm and dry. Capillary refill takes less than 2 seconds. No pallor.   Psychiatric: She has a normal mood and affect.         ED Course   Procedures  Labs Reviewed   CBC W/ AUTO DIFFERENTIAL - Abnormal; Notable for the following components:       Result Value    WBC 14.37 (*)     RBC 3.99 (*)     Hematocrit 36.3 (*)     Gran # (ANC) 8.7 (*)     Immature Grans (Abs) 0.05 (*)     All other components within normal limits   BASIC METABOLIC PANEL   HIV 1 /  2 ANTIBODY    Narrative:     Release to patient->Immediate   HEPATITIS C ANTIBODY    Narrative:     Release to patient->Immediate          Imaging Results               CT Soft Tissue Neck With Contrast (Final result)  Result time 06/26/24 09:01:32      Final result by Sandeep Perdomo MD (06/26/24 09:01:32)                   Impression:      1. Motion artifact limits evaluation.  2. Suspected edematous change within the oropharynx, hypopharynx and supraglottic larynx resulting in moderate to severe narrowing of the oral airway.  Differential diagnosis includes both infectious and inflammatory etiologies.  Correlate clinically with possible fever and/or elevated white count.  Close interval follow-up is recommended.  3. Reactive cervical and submandibular lymph nodes.  The preliminary and final reports are concordant.  This report was flagged in Epic as abnormal.    Examination added to the ER follow-up folder.      Electronically signed by: Sandeep Perdomo  Date:    06/26/2024  Time:    09:01               Narrative:    EXAMINATION:  CT SOFT TISSUE NECK WITH CONTRAST    CLINICAL HISTORY:  R sided soft tissue swelling. Suspected parotitis.;    TECHNIQUE:  Low dose axial images as well as sagittal and coronal reconstructions were performed from the skull base to the clavicles following the intravenous administration of 75 mL of Omnipaque 350.    COMPARISON:  None    FINDINGS:  Motion artifact slightly limits evaluation.    There is suspected edematous change within the oropharynx, hypopharynx and supraglottic larynx with moderate to severe narrowing of the oral airway.  There is mild prevertebral soft tissue swelling.  Mild hypertrophy of the palatine and sublingual tonsil.  Mild hypertrophy of the adenoid tissue.    The parotid, submandibular and sublingual glands are normal in size and enhance homogeneously.  Thyroid gland enhances homogeneously.    There are prominent bilateral cervical and submandibular lymph  nodes measuring up to 11 mm in size.  These may be reactive in etiology.  No significant supraclavicular lymphadenopathy.    Visualized lung apices are clear.                                       Medications   ketorolac injection 15 mg (15 mg Intravenous Given 6/26/24 0221)   iohexoL (OMNIPAQUE 350) injection 75 mL (75 mLs Intravenous Given 6/26/24 0147)   dexAMETHasone injection 16 mg (16 mg Intravenous Given 6/26/24 9603)   amoxicillin-clavulanate 875-125mg per tablet 1 tablet (1 tablet Oral Given 6/26/24 0450)     Medical Decision Making  43-year-old female presenting with symptoms consistent with parotitis.  Suspect likely viral in nature given her viral URI symptoms.  She has no change in phonation, decreased range of motion in her neck, or abnormalities involving her ear tympanic membrane.  He is afebrile with stable vitals.  Overall well-appearing and nontoxic.    We will get labs and a CT.  She was given Toradol for pain.  CT showed evidence parotitis without any drainable fluid abscess.  Because of the significant swelling discussed the case with Dr. Greene with ENT who recommended 16 mg of Decadron, IV antibiotics, and discharged with a Medrol Dosepak and antibiotics.  He will see her on Thursday.  Patient declined to wait for IV antibiotics but did receive her steroids.  She is given dose of oral antibiotics discharged home with return precautions    Amount and/or Complexity of Data Reviewed  Labs: ordered. Decision-making details documented in ED Course.  Radiology: ordered and independent interpretation performed. Decision-making details documented in ED Course.    Risk  OTC drugs.  Prescription drug management.                                      Clinical Impression:  Final diagnoses:  [K11.20] Parotitis (Primary)          ED Disposition Condition    Discharge Stable          ED Prescriptions       Medication Sig Dispense Start Date End Date Auth. Provider    methylPREDNISolone (MEDROL DOSEPACK) 4 mg  tablet Take dose pack as directed 1 each 6/26/2024 7/17/2024 Adarsh Mendoza MD    amoxicillin-clavulanate 875-125mg (AUGMENTIN) 875-125 mg per tablet Take 1 tablet by mouth 2 (two) times daily. 14 tablet 6/26/2024 -- Adarsh Mendoza MD    guaiFENesin (MUCINEX) 600 mg 12 hr tablet Take 2 tablets (1,200 mg total) by mouth 2 (two) times daily. for 10 days 40 tablet 6/26/2024 7/6/2024 Adarsh Mendoza MD          Follow-up Information       Follow up With Specialties Details Why Contact Info    Pedrito Greene MD Otolaryngology Go in 2 days  [address] [facility] [place] [address] [phone]      [facility] Emergency Dept Emergency Medicine  As needed [address] [facility] [address] [phone]    Ivan Meek MD Family Medicine  As needed [address] [place] [address] [phone]               Adarsh Mendoza MD  06/26/24 1450

## 2024-06-26 NOTE — DISCHARGE INSTRUCTIONS
Please take antibiotics, steroids, and guaifenesin as directed.  Dr. Greene recommended you drink 2 course of water daily.  Please return to the emergency department if you experience any worsening swelling, pain, difficulty breathing, or difficulty swallowing.  Follow up with Dr. Greene on Thursday.  Call his office to schedule your appointment and let them know that you were seen in the emergency department.

## 2024-06-27 ENCOUNTER — PATIENT MESSAGE (OUTPATIENT)
Dept: OTOLARYNGOLOGY | Facility: CLINIC | Age: 44
End: 2024-06-27

## 2024-06-27 ENCOUNTER — OFFICE VISIT (OUTPATIENT)
Dept: OTOLARYNGOLOGY | Facility: CLINIC | Age: 44
End: 2024-06-27
Payer: COMMERCIAL

## 2024-06-27 VITALS — WEIGHT: 152 LBS | BODY MASS INDEX: 26.93 KG/M2 | HEIGHT: 63 IN

## 2024-06-27 DIAGNOSIS — K11.20 PAROTIDITIS: Primary | ICD-10-CM

## 2024-06-27 PROCEDURE — 1160F RVW MEDS BY RX/DR IN RCRD: CPT | Mod: CPTII,S$GLB,, | Performed by: OTOLARYNGOLOGY

## 2024-06-27 PROCEDURE — 99203 OFFICE O/P NEW LOW 30 MIN: CPT | Mod: S$GLB,,, | Performed by: OTOLARYNGOLOGY

## 2024-06-27 PROCEDURE — 3008F BODY MASS INDEX DOCD: CPT | Mod: CPTII,S$GLB,, | Performed by: OTOLARYNGOLOGY

## 2024-06-27 PROCEDURE — 99999 PR PBB SHADOW E&M-EST. PATIENT-LVL III: CPT | Mod: PBBFAC,,, | Performed by: OTOLARYNGOLOGY

## 2024-06-27 PROCEDURE — 1159F MED LIST DOCD IN RCRD: CPT | Mod: CPTII,S$GLB,, | Performed by: OTOLARYNGOLOGY

## 2024-06-27 NOTE — PROGRESS NOTES
Subjective:       Patient ID: Caitlin Delaney is a 43 y.o. female.    Chief Complaint: Facial Swelling (Pt c/o facial swelling on the left side since Monday. Pt was seen at the ED on Tuesday. )      Comes in because Tuesday night two nights ago she was in the emergency room and she has a sudden onset of left facial swelling.  She was seen there a CT scan was done for some reason that shows the expected parotid swelling no stone was noted I have reviewed that myself and she was given a dose of IV steroids and IV antibiotics Augmentin was sent to the pharmacy she has not picked that up yet because that is gone down and that is feeling much more normal in the past two days.  She has never had anything like this before.          Objective:      ENT Physical Exam    So her parotid feels relatively normal today if there is some asymmetry is minimal shows me a picture that has so is a fairly dramatic swelling just two days ago obviously a parotiditis on the left.    The duct orifice might be a little bit red and she does have a sharp tooth edge of the left upper molar where there is a filling and she may have lost a little tooth between the filling and the cusp causing a rough edge that has perhaps the underlying cause of this.  She has seeing a dentist soon and I have asked her to bring that to their attention that maybe able to smooth this one tooth cusp out that may or may not be a source of the issue     Her neck is without adenopathy and we have reviewed the likely pathophysiology of this she wonders about picking up the antibiotics since things are going so well I have encouraged her to at least find out how long the [facility] we will hold that for her to  since the weekend ahead of her and if it flares back up or becomes tender again I think she should start antibiotics but in his current condition it looks as if this has resolved for now and was probably much more blockage than infection.         Assessment:       1. Parotiditis         Plan:          So she is going to just monitor this for now, she is going to see how long the [facility] will hold her prescription and if they will keep it through the weekend for her to  she is going to pick it up if needed and I have offered to discuss this with her if things are not going well.         Cooperative Cooperative Unable to assess Cooperative

## 2024-07-29 ENCOUNTER — HOSPITAL ENCOUNTER (EMERGENCY)
Facility: HOSPITAL | Age: 44
Discharge: HOME | End: 2024-07-29
Attending: EMERGENCY MEDICINE
Payer: COMMERCIAL

## 2024-07-29 VITALS
WEIGHT: 150 LBS | SYSTOLIC BLOOD PRESSURE: 123 MMHG | HEIGHT: 63 IN | HEART RATE: 105 BPM | DIASTOLIC BLOOD PRESSURE: 80 MMHG | RESPIRATION RATE: 20 BRPM | BODY MASS INDEX: 26.58 KG/M2 | OXYGEN SATURATION: 99 % | TEMPERATURE: 99 F

## 2024-07-29 DIAGNOSIS — M25.511 ACUTE PAIN OF RIGHT SHOULDER: Primary | ICD-10-CM

## 2024-07-29 PROCEDURE — 99281 EMR DPT VST MAYX REQ PHY/QHP: CPT
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
## (undated) DEVICE — GUIDE WIRE MOTION .035 X 150CM

## (undated) DEVICE — GOWN POLY REINF BRTH SLV XL

## (undated) DEVICE — SLEEVE SCD EXPRESS KNEE MEDIUM

## (undated) DEVICE — SOL NACL IRR 3000ML

## (undated) DEVICE — SYR ONLY LUER LOCK 20CC

## (undated) DEVICE — BOWL STERILE LARGE 32OZ

## (undated) DEVICE — BAG LINGEMAN DRAIN UROLOGY

## (undated) DEVICE — CATH POLLACK OPEN-END FLEXI-TI

## (undated) DEVICE — GLOVE SENSICARE PI ALOE 7

## (undated) DEVICE — JELLY SURGILUBE LUBE TUBE 2OZ

## (undated) DEVICE — GLOVE SENSICARE PI ALOE 7.5

## (undated) DEVICE — GOWN POLY REINF X-LONG XL

## (undated) DEVICE — EXTRACTOR STONE 4WR NIT 2.2F 1

## (undated) DEVICE — SET IRR URLGY 2LINE UNIV SPIKE

## (undated) DEVICE — FIBER QUANTA OPT SDT 272UM

## (undated) DEVICE — DRAPE CYSTOSCOPY LARGE

## (undated) DEVICE — LEGGING CLEAR POLY 2/PACK

## (undated) DEVICE — COVER TABLE 44X90 STERILE

## (undated) DEVICE — CONTAINER SPECIMEN OR STER 4OZ

## (undated) DEVICE — SPONGE BULKEE II ABSRB 6X6.75

## (undated) DEVICE — SCRUB DYNA-HEX LIQ 4% CHG 4OZ